# Patient Record
Sex: FEMALE | Race: WHITE | URBAN - METROPOLITAN AREA
[De-identification: names, ages, dates, MRNs, and addresses within clinical notes are randomized per-mention and may not be internally consistent; named-entity substitution may affect disease eponyms.]

---

## 2022-07-28 ENCOUNTER — APPOINTMENT (OUTPATIENT)
Dept: GENERAL RADIOLOGY | Age: 57
End: 2022-07-28
Payer: COMMERCIAL

## 2022-07-28 ENCOUNTER — APPOINTMENT (OUTPATIENT)
Dept: CT IMAGING | Age: 57
End: 2022-07-28
Payer: COMMERCIAL

## 2022-07-28 ENCOUNTER — HOSPITAL ENCOUNTER (EMERGENCY)
Age: 57
Discharge: HOME | End: 2022-07-28
Attending: STUDENT IN AN ORGANIZED HEALTH CARE EDUCATION/TRAINING PROGRAM
Payer: COMMERCIAL

## 2022-07-28 VITALS
BODY MASS INDEX: 47.09 KG/M2 | OXYGEN SATURATION: 95 % | HEART RATE: 93 BPM | WEIGHT: 293 LBS | RESPIRATION RATE: 18 BRPM | HEIGHT: 66 IN | SYSTOLIC BLOOD PRESSURE: 156 MMHG | DIASTOLIC BLOOD PRESSURE: 73 MMHG | TEMPERATURE: 98.2 F

## 2022-07-28 DIAGNOSIS — R06.02 SHORTNESS OF BREATH: ICD-10-CM

## 2022-07-28 DIAGNOSIS — R07.9 CHEST PAIN, UNSPECIFIED TYPE: Primary | ICD-10-CM

## 2022-07-28 DIAGNOSIS — J18.9 PNEUMONIA OF RIGHT LOWER LOBE DUE TO INFECTIOUS ORGANISM: ICD-10-CM

## 2022-07-28 DIAGNOSIS — I10 PRIMARY HYPERTENSION: ICD-10-CM

## 2022-07-28 LAB
A/G RATIO: 1.3 (ref 1.1–2.2)
ALBUMIN SERPL-MCNC: 4.3 G/DL (ref 3.4–5)
ALP BLD-CCNC: 118 U/L (ref 40–129)
ALT SERPL-CCNC: 59 U/L (ref 10–40)
ANION GAP SERPL CALCULATED.3IONS-SCNC: 13 MMOL/L (ref 3–16)
AST SERPL-CCNC: 62 U/L (ref 15–37)
BASOPHILS ABSOLUTE: 0.1 K/UL (ref 0–0.2)
BASOPHILS RELATIVE PERCENT: 1.5 %
BILIRUB SERPL-MCNC: 0.3 MG/DL (ref 0–1)
BUN BLDV-MCNC: 15 MG/DL (ref 7–20)
CALCIUM SERPL-MCNC: 9.7 MG/DL (ref 8.3–10.6)
CHLORIDE BLD-SCNC: 101 MMOL/L (ref 99–110)
CO2: 24 MMOL/L (ref 21–32)
CREAT SERPL-MCNC: 0.6 MG/DL (ref 0.6–1.1)
EKG ATRIAL RATE: 97 BPM
EKG DIAGNOSIS: NORMAL
EKG P AXIS: 54 DEGREES
EKG P-R INTERVAL: 210 MS
EKG Q-T INTERVAL: 358 MS
EKG QRS DURATION: 86 MS
EKG QTC CALCULATION (BAZETT): 454 MS
EKG R AXIS: 101 DEGREES
EKG T AXIS: 6 DEGREES
EKG VENTRICULAR RATE: 97 BPM
EOSINOPHILS ABSOLUTE: 0.2 K/UL (ref 0–0.6)
EOSINOPHILS RELATIVE PERCENT: 2.7 %
GFR AFRICAN AMERICAN: >60
GFR NON-AFRICAN AMERICAN: >60
GLUCOSE BLD-MCNC: 164 MG/DL (ref 70–99)
HCT VFR BLD CALC: 36.7 % (ref 36–48)
HEMOGLOBIN: 12 G/DL (ref 12–16)
LIPASE: 40 U/L (ref 13–60)
LYMPHOCYTES ABSOLUTE: 1.5 K/UL (ref 1–5.1)
LYMPHOCYTES RELATIVE PERCENT: 19 %
MCH RBC QN AUTO: 29.7 PG (ref 26–34)
MCHC RBC AUTO-ENTMCNC: 32.7 G/DL (ref 31–36)
MCV RBC AUTO: 90.6 FL (ref 80–100)
MONOCYTES ABSOLUTE: 0.9 K/UL (ref 0–1.3)
MONOCYTES RELATIVE PERCENT: 11.3 %
NEUTROPHILS ABSOLUTE: 5.2 K/UL (ref 1.7–7.7)
NEUTROPHILS RELATIVE PERCENT: 65.5 %
PDW BLD-RTO: 13.5 % (ref 12.4–15.4)
PLATELET # BLD: 371 K/UL (ref 135–450)
PMV BLD AUTO: 7.5 FL (ref 5–10.5)
POTASSIUM SERPL-SCNC: 3.9 MMOL/L (ref 3.5–5.1)
PRO-BNP: 29 PG/ML (ref 0–124)
RBC # BLD: 4.05 M/UL (ref 4–5.2)
SARS-COV-2, NAAT: NOT DETECTED
SODIUM BLD-SCNC: 138 MMOL/L (ref 136–145)
TOTAL PROTEIN: 7.7 G/DL (ref 6.4–8.2)
TROPONIN: <0.01 NG/ML
TROPONIN: <0.01 NG/ML
WBC # BLD: 7.9 K/UL (ref 4–11)

## 2022-07-28 PROCEDURE — 99285 EMERGENCY DEPT VISIT HI MDM: CPT

## 2022-07-28 PROCEDURE — 94640 AIRWAY INHALATION TREATMENT: CPT

## 2022-07-28 PROCEDURE — 6370000000 HC RX 637 (ALT 250 FOR IP): Performed by: STUDENT IN AN ORGANIZED HEALTH CARE EDUCATION/TRAINING PROGRAM

## 2022-07-28 PROCEDURE — 83880 ASSAY OF NATRIURETIC PEPTIDE: CPT

## 2022-07-28 PROCEDURE — 71045 X-RAY EXAM CHEST 1 VIEW: CPT

## 2022-07-28 PROCEDURE — 80053 COMPREHEN METABOLIC PANEL: CPT

## 2022-07-28 PROCEDURE — 6360000004 HC RX CONTRAST MEDICATION: Performed by: STUDENT IN AN ORGANIZED HEALTH CARE EDUCATION/TRAINING PROGRAM

## 2022-07-28 PROCEDURE — 93010 ELECTROCARDIOGRAM REPORT: CPT | Performed by: INTERNAL MEDICINE

## 2022-07-28 PROCEDURE — 93005 ELECTROCARDIOGRAM TRACING: CPT | Performed by: STUDENT IN AN ORGANIZED HEALTH CARE EDUCATION/TRAINING PROGRAM

## 2022-07-28 PROCEDURE — 84484 ASSAY OF TROPONIN QUANT: CPT

## 2022-07-28 PROCEDURE — 71260 CT THORAX DX C+: CPT | Performed by: STUDENT IN AN ORGANIZED HEALTH CARE EDUCATION/TRAINING PROGRAM

## 2022-07-28 PROCEDURE — 87635 SARS-COV-2 COVID-19 AMP PRB: CPT

## 2022-07-28 PROCEDURE — 83690 ASSAY OF LIPASE: CPT

## 2022-07-28 PROCEDURE — 85025 COMPLETE CBC W/AUTO DIFF WBC: CPT

## 2022-07-28 RX ORDER — IPRATROPIUM BROMIDE AND ALBUTEROL SULFATE 2.5; .5 MG/3ML; MG/3ML
1 SOLUTION RESPIRATORY (INHALATION)
Status: DISCONTINUED | OUTPATIENT
Start: 2022-07-28 | End: 2022-07-28

## 2022-07-28 RX ORDER — DOXYCYCLINE 100 MG/1
100 TABLET ORAL 2 TIMES DAILY
Qty: 20 TABLET | Refills: 0 | Status: SHIPPED | OUTPATIENT
Start: 2022-07-28 | End: 2022-08-07

## 2022-07-28 RX ORDER — IPRATROPIUM BROMIDE AND ALBUTEROL SULFATE 2.5; .5 MG/3ML; MG/3ML
1 SOLUTION RESPIRATORY (INHALATION) ONCE
Status: COMPLETED | OUTPATIENT
Start: 2022-07-28 | End: 2022-07-28

## 2022-07-28 RX ORDER — MORPHINE SULFATE 4 MG/ML
4 INJECTION, SOLUTION INTRAMUSCULAR; INTRAVENOUS ONCE
Status: DISCONTINUED | OUTPATIENT
Start: 2022-07-28 | End: 2022-07-28 | Stop reason: HOSPADM

## 2022-07-28 RX ORDER — PREDNISONE 50 MG/1
50 TABLET ORAL DAILY
Qty: 5 TABLET | Refills: 0 | Status: SHIPPED | OUTPATIENT
Start: 2022-07-28 | End: 2022-08-02

## 2022-07-28 RX ADMIN — IPRATROPIUM BROMIDE AND ALBUTEROL SULFATE 1 AMPULE: .5; 2.5 SOLUTION RESPIRATORY (INHALATION) at 09:27

## 2022-07-28 RX ADMIN — IOPAMIDOL 75 ML: 755 INJECTION, SOLUTION INTRAVENOUS at 09:19

## 2022-07-28 ASSESSMENT — ENCOUNTER SYMPTOMS
NAUSEA: 0
SHORTNESS OF BREATH: 1
ABDOMINAL PAIN: 1
SORE THROAT: 0
VOMITING: 0
COUGH: 0
DIARRHEA: 0
WHEEZING: 1
CONSTIPATION: 0
CHEST TIGHTNESS: 1
SINUS PRESSURE: 0
COLOR CHANGE: 0

## 2022-07-28 ASSESSMENT — PAIN DESCRIPTION - LOCATION: LOCATION: CHEST

## 2022-07-28 ASSESSMENT — PAIN SCALES - GENERAL: PAINLEVEL_OUTOF10: 6

## 2022-07-28 ASSESSMENT — PAIN DESCRIPTION - DESCRIPTORS: DESCRIPTORS: PRESSURE

## 2022-07-28 ASSESSMENT — PAIN - FUNCTIONAL ASSESSMENT: PAIN_FUNCTIONAL_ASSESSMENT: 0-10

## 2022-07-28 NOTE — Clinical Note
Vaibhav Baum was seen and treated in our emergency department on 7/28/2022. She may return to work on 08/01/2022. If you have any questions or concerns, please don't hesitate to call.       Leny Davis MD

## 2022-07-28 NOTE — ED PROVIDER NOTES
[address] [facility] Emergency Department       Date of evaluation: 7/28/2022    Chief Complaint     Chest Pain (Started after midnight, hx of asthma, given 2 of nitro and 324 asa in route with some relief)      History of Present Illness     Tacos Berry is a 61 y.o. female who presents chest pain and shortness of breath. She has history of asthma, diabetes, hyperlipidemia, hypertension, MEHUL on CPAP. States that chest pain started around midnight while she was at work. Described as pressure-like and worsens with inspiration. Chest pain has been constant. She received fentanyl and full dose of aspirin in route with some improvement of her chest pain however she reports that since arrival chest pain is increasing in severity. Reports increased shortness of breath. Symptoms worsen with exertion. states she feels like she cannot catch her breath. Denies any fever, cough, nausea, vomiting. Reports that she has history of acid reflux however this is not worsened with chest pain onset. Reports chronic abdominal pain which is not changed in character. Denies any dysuria, change in urinary frequency, diarrhea, constipation, or bloody stools. Reports increasing lower extremity swelling bilaterally. She is compliant with her CPAP nightly. Review of Systems     Review of Systems   Constitutional:  Positive for activity change and fatigue. Negative for chills and fever. HENT:  Negative for congestion, ear pain, sinus pressure and sore throat. Eyes:  Negative for visual disturbance. Respiratory:  Positive for chest tightness, shortness of breath and wheezing. Negative for cough. Cardiovascular:  Positive for chest pain and leg swelling. Negative for palpitations. Gastrointestinal:  Positive for abdominal pain. Negative for constipation, diarrhea, nausea and vomiting. Endocrine: Negative for polydipsia and polyuria.    Genitourinary:  Negative for dysuria, flank pain and urgency. Musculoskeletal:  Negative for neck pain and neck stiffness. Skin:  Negative for color change. Neurological:  Negative for dizziness, syncope, weakness, light-headedness and headaches. Psychiatric/Behavioral:  Negative for confusion. The patient is nervous/anxious. Past Medical, Surgical, Family, and Social History     She has a past medical history of Asthma, CPAP rhinitis, Diabetes mellitus ([address] Hyperlipidemia, Hypertension, MEHUL on CPAP, and Swelling of extremity. She has a past surgical history that includes ovarian cyst removal; Ovary removal; Cholecystectomy; and Carpal tunnel release. Her family history includes Arthritis in her father; Heart Disease in her father and mother; High Blood Pressure in her father; Other in her mother and sister; Stroke in her father. She reports current alcohol use. She reports that she does not use drugs.     Medications     Discharge Medication List as of 7/28/2022  1:46 PM        CONTINUE these medications which have NOT CHANGED    Details   irbesartan (AVAPRO) 300 MG tablet Take 300 mg by mouth nightly      fluticasone (FLONASE) 50 MCG/ACT nasal spray 1 spray by Nasal route daily      vitamin B-12 (CYANOCOBALAMIN) 500 MCG tablet Take 500 mcg by mouth daily      Cholecalciferol (VITAMIN D3) 2000 UNITS CAPS Take by mouth      Multiple Vitamins-Minerals (THERAPEUTIC MULTIVITAMIN-MINERALS) tablet Take 1 tablet by mouth daily Alive women's energy      ibuprofen (ADVIL;MOTRIN) 400 MG tablet Take 400 mg by mouth every 6 hours as needed for Pain      prednisoLONE acetate (PRED FORTE) 1 % ophthalmic suspension Place 1 drop into both eyes 4 times daily      potassium chloride (KLOR-CON) 20 MEQ packet Take 20 mEq by mouth daily      metFORMIN (GLUCOPHAGE) 500 MG tablet Take 500 mg by mouth 2 times daily (with meals)      guanFACINE (TENEX) 1 MG tablet Take 2 mg by mouth 2 times daily      albuterol (PROVENTIL HFA;VENTOLIN HFA) 108 (90 BASE) MCG/ACT inhaler Inhale 2 puffs into the lungs every 6 hours as needed for Wheezing      Fish Oil-Cholecalciferol (FISH OIL + D3) 5991-4634 MG-UNIT CAPS Take by mouth See Admin Instructions 1 tab in am, 2 tab at night      furosemide (LASIX) 80 MG tablet   Take 80 mg by mouth daily       sertraline (ZOLOFT) 50 MG tablet   Take 100 mg by mouth daily              Allergies     She is allergic to aloe, sulfa antibiotics, and sunflower oil. Physical Exam     INITIAL VITALS: BP: (!) 205/79, Temp: 98.2 °F (36.8 °C), Heart Rate: (!) 101, Resp: 18, SpO2: 96 %   Physical Exam  Vitals and nursing note reviewed. Constitutional:       Appearance: She is obese. She is not toxic-appearing. HENT:      Head: Normocephalic and atraumatic. Right Ear: External ear normal.      Left Ear: External ear normal.      Nose: Nose normal.      Mouth/Throat:      Pharynx: Oropharynx is clear. Eyes:      General: No scleral icterus. Conjunctiva/sclera: Conjunctivae normal.      Pupils: Pupils are equal, round, and reactive to light. Cardiovascular:      Rate and Rhythm: Regular rhythm. Tachycardia present. Heart sounds: Normal heart sounds. No murmur heard. No friction rub. No gallop. Pulmonary:      Effort: No respiratory distress. Breath sounds: Wheezing present. No rhonchi or rales. Chest:      Chest wall: Tenderness (left wall tenderness) present. Abdominal:      General: There is no distension. Palpations: Abdomen is soft. Tenderness: There is no abdominal tenderness. There is no right CVA tenderness, left CVA tenderness, guarding or rebound. Musculoskeletal:         General: Normal range of motion. Cervical back: Normal range of motion and neck supple. No rigidity. Right lower leg: Edema (1+ non pitting) present. Left lower leg: Edema (1+ non pitting) present. Skin:     Capillary Refill: Capillary refill takes less than 2 seconds. Coloration: Skin is not pale. Findings: No rash. Neurological:      General: No focal deficit present. Mental Status: She is alert. Cranial Nerves: No cranial nerve deficit. Sensory: No sensory deficit. Motor: No weakness. Coordination: Coordination normal.      Gait: Gait normal.   Psychiatric:         Mood and Affect: Mood normal.         Behavior: Behavior normal.       Diagnostic Results     EKG   I interpreted the EKG and note:  Sinus rhythm with first-degree AV block, ventricular rate of 97, rightward axis, T wave inversion inferior lead, no ST elevation or depression concerning for ischemia. No prior EKG to compare to    RADIOLOGY:  CT CHEST PULMONARY EMBOLISM W CONTRAST   Final Result   Suboptimal opacification of the pulmonary arteries limiting evaluation for   pulmonary embolism. No evidence of central pulmonary embolism.          XR CHEST PORTABLE   Final Result   Right basilar segmental atelectasis versus pneumonia             LABS:   Results for orders placed or performed during the hospital encounter of 07/28/22   SARS-CoV-2 NAAT (Rapid)    Specimen: Nasopharyngeal Swab   Result Value Ref Range    SARS-CoV-2, NAAT Not Detected Not Detected   CBC with Auto Differential   Result Value Ref Range    WBC 7.9 4.0 - 11.0 K/uL    RBC 4.05 4.00 - 5.20 M/uL    Hemoglobin 12.0 12.0 - 16.0 g/dL    Hematocrit 36.7 36.0 - 48.0 %    MCV 90.6 80.0 - 100.0 fL    MCH 29.7 26.0 - 34.0 pg    MCHC 32.7 31.0 - 36.0 g/dL    RDW 13.5 12.4 - 15.4 %    Platelets 897 629 - 132 K/uL    MPV 7.5 5.0 - 10.5 fL    Neutrophils % 65.5 %    Lymphocytes % 19.0 %    Monocytes % 11.3 %    Eosinophils % 2.7 %    Basophils % 1.5 %    Neutrophils Absolute 5.2 1.7 - 7.7 K/uL    Lymphocytes Absolute 1.5 1.0 - 5.1 K/uL    Monocytes Absolute 0.9 0.0 - 1.3 K/uL    Eosinophils Absolute 0.2 0.0 - 0.6 K/uL    Basophils Absolute 0.1 0.0 - 0.2 K/uL   CMP   Result Value Ref Range    Sodium 138 136 - 145 mmol/L    Potassium 3.9 3.5 - 5.1 mmol/L    Chloride 101 99 - 110 mmol/L    CO2 24 21 - 32 mmol/L    Anion Gap 13 3 - 16    Glucose 164 (H) 70 - 99 mg/dL    BUN 15 7 - 20 mg/dL    Creatinine 0.6 0.6 - 1.1 mg/dL    GFR Non-African American >60 >60    GFR African American >60 >60    Calcium 9.7 8.3 - 10.6 mg/dL    Total Protein 7.7 6.4 - 8.2 g/dL    Albumin 4.3 3.4 - 5.0 g/dL    Albumin/Globulin Ratio 1.3 1.1 - 2.2    Total Bilirubin 0.3 0.0 - 1.0 mg/dL    Alkaline Phosphatase 118 40 - 129 U/L    ALT 59 (H) 10 - 40 U/L    AST 62 (H) 15 - 37 U/L   Lipase   Result Value Ref Range    Lipase 40.0 13.0 - 60.0 U/L   Troponin   Result Value Ref Range    Troponin <0.01 <0.01 ng/mL   Brain Natriuretic Peptide   Result Value Ref Range    Pro-BNP 29 0 - 124 pg/mL   Troponin   Result Value Ref Range    Troponin <0.01 <0.01 ng/mL   EKG 12 Lead   Result Value Ref Range    Ventricular Rate 97 BPM    Atrial Rate 97 BPM    P-R Interval 210 ms    QRS Duration 86 ms    Q-T Interval 358 ms    QTc Calculation (Bazett) 454 ms    P Axis 54 degrees    R Axis 101 degrees    T Axis 6 degrees    Diagnosis       Sinus rhythm with 1st degree A-V blockRightward axisSeptal infarct , age undeterminedT wave abnormality, consider inferior ischemiaAbnormal ECGWhen compared with ECG of 12-JUN-2008 17:23,RI interval has increasedSeptal infarct is now Present       ED BEDSIDE ULTRASOUND:  No results found. RECENT VITALS:  BP: (!) 156/73,Temp: 98.2 °F (36.8 °C), Heart Rate: 93, Resp: 18, SpO2: 95 %     Procedures     NA    ED Course     Nursing Notes, Past Medical Hx, Past Surgical Hx, Social Hx,Allergies, and Family Hx were reviewed.          patient was given the following medications:  Orders Placed This Encounter   Medications    DISCONTD: morphine sulfate (PF) injection 4 mg    DISCONTD: ipratropium-albuterol (DUONEB) nebulizer solution 1 ampule     Order Specific Question:   Initiate RT Bronchodilator Protocol     Answer:   Yes - ED protocol    iopamidol (ISOVUE-370) 76 % injection 75 mL    ipratropium-albuterol (DUONEB) nebulizer solution 1 ampule     Order Specific Question:   Initiate RT Bronchodilator Protocol     Answer:   Yes - ED protocol    doxycycline monohydrate (ADOXA) 100 MG tablet     Sig: Take 1 tablet by mouth in the morning and 1 tablet before bedtime. Do all this for 10 days. Dispense:  20 tablet     Refill:  0    predniSONE (DELTASONE) 50 MG tablet     Sig: Take 1 tablet by mouth in the morning for 5 days. Dispense:  5 tablet     Refill:  0       CONSULTS:  None    MEDICAL DECISIONMAKING / ASSESSMENT / PLAN     Ciara Lund is a 61 y.o. female chest pain. Patient presented hypertensive to 205/79, afebrile, tachycardic at 101, normal respiratory rate of 18 and satting at 96% on room air. On exam she did have diffuse and expiratory wheezing as well as some bilateral nonpitting edema. Given history and physical exam my differential diagnosis includes but is not limited to ACS, asthma exacerbation, COPD exacerbation, hypertensive emergency, pneumonia, CHF, PE. We obtained labs and imaging studies as noted below    I interpreted the labs and note:  CBC: No leukocytosis or leukopenia, no evidence of anemia with a hemoglobin of 12 and hematocrit of 36.7, normal platelet count  CMP no significant electrolyte derangements, normal renal function with a BUN of 15 and creatinine of 0.6, hyperglycemic to 164, elevated AST to 62 and ALT to 59 however this is improved from prior. Appears to have chronically elevated but stable transaminases. Normal alk phos and bilirubin. Normal lipase at 40  proBNP within normal limits at 29  Troponin negative at less than 0.01  Delta troponin negative at less than 0.01      I interpreted the imaging and note:  Chest x-ray right basilar segmental atelectasis concerning for pneumonia. She does have mild cardiomegaly. No effusion or pneumothorax  CTA chest: No evidence of PE or additional acute cardiac change.     Given work-up chest pain and shortness of breath likely secondary to developing pneumonia. I am reassured by her EKG and troponin negative x2. Patient is moderate risk heart score however at this time she feels that her symptoms are likely due to pneumonia and asthma. She denies wanting to be admitted for this. She was able to ambulate with O2 sats remaining at 92%. Patient does not appear septic or meet SIRS criteria. Given this we felt patient was stable for discharge home. I will start her on doxycycline as well as prednisone to improve her breathing. Patient is amenable with this. Her hypertension improved to 156/73 at the time of discharge. Strict return precautions were discussed. All questions and concerns were addressed. Clinical Impression     1. Chest pain, unspecified type    2. Primary hypertension    3. Pneumonia of right lower lobe due to infectious organism    4. Shortness of breath        Disposition     PATIENT REFERRED TO:  No follow-up provider specified. DISCHARGE MEDICATIONS:  Discharge Medication List as of 7/28/2022  1:46 PM        START taking these medications    Details   doxycycline monohydrate (ADOXA) 100 MG tablet Take 1 tablet by mouth in the morning and 1 tablet before bedtime. Do all this for 10 days. , Disp-20 tablet, R-0Normal      predniSONE (DELTASONE) 50 MG tablet Take 1 tablet by mouth in the morning for 5 days. , Disp-5 tablet, R-0Normal             DISPOSITION Decision To Discharge 07/28/2022 01:10:01 PM         Nikki Rush MD  07/28/22 0245

## 2022-07-28 NOTE — ED NOTES
Patient ambulated with pulse ox and maintained 92% O2 on RA but was short of breath which is normal due to her asthma.      Frederic Shepherd RN  07/28/22 [id number]

## 2022-07-28 NOTE — DISCHARGE INSTRUCTIONS
You were seen in the emergency department for shortness of breath and chest pain. We feel that your symptoms are likely due to possible pneumonia. We are prescribing you antibiotics and prednisone which should help with your symptoms. Please make sure to check your glucose closely while on prednisone as it can elevate the reading. Return to the emergency department if you have ongoing or worsening shortness of breath, chest pain, vomiting, passout, fever or any new or concerning changes to your health.

## 2023-12-04 ENCOUNTER — HOSPITAL ENCOUNTER (INPATIENT)
Age: 58
LOS: 10 days | Discharge: HOME | End: 2023-12-14
Attending: EMERGENCY MEDICINE | Admitting: INTERNAL MEDICINE
Payer: COMMERCIAL

## 2023-12-04 ENCOUNTER — APPOINTMENT (OUTPATIENT)
Dept: GENERAL RADIOLOGY | Age: 58
End: 2023-12-04
Payer: COMMERCIAL

## 2023-12-04 ENCOUNTER — APPOINTMENT (OUTPATIENT)
Dept: CT IMAGING | Age: 58
End: 2023-12-04
Payer: COMMERCIAL

## 2023-12-04 DIAGNOSIS — R65.20 SEVERE SEPSIS (HCC): ICD-10-CM

## 2023-12-04 DIAGNOSIS — J18.9 PNEUMONIA OF RIGHT LOWER LOBE DUE TO INFECTIOUS ORGANISM: ICD-10-CM

## 2023-12-04 DIAGNOSIS — R09.02 HYPOXIA: ICD-10-CM

## 2023-12-04 DIAGNOSIS — E87.20 LACTIC ACIDOSIS: ICD-10-CM

## 2023-12-04 DIAGNOSIS — A41.9 SEVERE SEPSIS (HCC): ICD-10-CM

## 2023-12-04 DIAGNOSIS — J96.01 ACUTE RESPIRATORY FAILURE WITH HYPOXIA (HCC): Primary | ICD-10-CM

## 2023-12-04 DIAGNOSIS — R06.03 ACUTE RESPIRATORY DISTRESS: ICD-10-CM

## 2023-12-04 DIAGNOSIS — J44.1 COPD EXACERBATION (HCC): ICD-10-CM

## 2023-12-04 LAB
ALBUMIN SERPL-MCNC: 4.6 G/DL (ref 3.4–5)
ALBUMIN/GLOB SERPL: 1.2 {RATIO} (ref 1.1–2.2)
ALP SERPL-CCNC: 99 U/L (ref 40–129)
ALT SERPL-CCNC: 66 U/L (ref 10–40)
ANION GAP SERPL CALCULATED.3IONS-SCNC: 18 MMOL/L (ref 3–16)
ANION GAP SERPL CALCULATED.3IONS-SCNC: 20 MMOL/L (ref 3–16)
AST SERPL-CCNC: 61 U/L (ref 15–37)
BACTERIA URNS QL MICRO: ABNORMAL /HPF
BASE EXCESS BLDV CALC-SCNC: 2.7 MMOL/L (ref -3–3)
BASOPHILS # BLD: 0 K/UL (ref 0–0.2)
BASOPHILS NFR BLD: 0.5 %
BILIRUB SERPL-MCNC: 0.5 MG/DL (ref 0–1)
BILIRUB UR QL STRIP.AUTO: NEGATIVE
BUN SERPL-MCNC: 11 MG/DL (ref 7–20)
BUN SERPL-MCNC: 14 MG/DL (ref 7–20)
CALCIUM SERPL-MCNC: 10.1 MG/DL (ref 8.3–10.6)
CALCIUM SERPL-MCNC: 9 MG/DL (ref 8.3–10.6)
CHLORIDE SERPL-SCNC: 92 MMOL/L (ref 99–110)
CHLORIDE SERPL-SCNC: 93 MMOL/L (ref 99–110)
CLARITY UR: CLEAR
CO2 BLDV-SCNC: 29 MMOL/L
CO2 SERPL-SCNC: 22 MMOL/L (ref 21–32)
CO2 SERPL-SCNC: 25 MMOL/L (ref 21–32)
COHGB MFR BLDV: 1.7 % (ref 0–1.5)
COLOR UR: ABNORMAL
CREAT SERPL-MCNC: 0.7 MG/DL (ref 0.6–1.1)
CREAT SERPL-MCNC: 0.9 MG/DL (ref 0.6–1.1)
DEPRECATED RDW RBC AUTO: 13.1 % (ref 12.4–15.4)
EKG ATRIAL RATE: 116 BPM
EKG DIAGNOSIS: NORMAL
EKG P AXIS: 65 DEGREES
EKG P-R INTERVAL: 172 MS
EKG Q-T INTERVAL: 326 MS
EKG QRS DURATION: 96 MS
EKG QTC CALCULATION (BAZETT): 453 MS
EKG R AXIS: 118 DEGREES
EKG T AXIS: -1 DEGREES
EKG VENTRICULAR RATE: 116 BPM
EOSINOPHIL # BLD: 0.1 K/UL (ref 0–0.6)
EOSINOPHIL NFR BLD: 1.3 %
EPI CELLS #/AREA URNS HPF: ABNORMAL /HPF (ref 0–5)
FLUAV RNA RESP QL NAA+PROBE: NOT DETECTED
FLUBV RNA RESP QL NAA+PROBE: NOT DETECTED
GFR SERPLBLD CREATININE-BSD FMLA CKD-EPI: >60 ML/MIN/{1.73_M2}
GFR SERPLBLD CREATININE-BSD FMLA CKD-EPI: >60 ML/MIN/{1.73_M2}
GLUCOSE BLD-MCNC: 248 MG/DL (ref 70–99)
GLUCOSE BLD-MCNC: 294 MG/DL (ref 70–99)
GLUCOSE SERPL-MCNC: 209 MG/DL (ref 70–99)
GLUCOSE SERPL-MCNC: 306 MG/DL (ref 70–99)
GLUCOSE UR STRIP.AUTO-MCNC: NEGATIVE MG/DL
HCO3 BLDV-SCNC: 27.3 MMOL/L (ref 23–29)
HCT VFR BLD AUTO: 41.8 % (ref 36–48)
HGB BLD-MCNC: 13.8 G/DL (ref 12–16)
HGB UR QL STRIP.AUTO: ABNORMAL
KETONES UR STRIP.AUTO-MCNC: NEGATIVE MG/DL
LACTATE BLDV-SCNC: 4.1 MMOL/L (ref 0.4–2)
LACTATE BLDV-SCNC: 4.1 MMOL/L (ref 0.4–2)
LACTATE BLDV-SCNC: 4.8 MMOL/L (ref 0.4–2)
LACTATE BLDV-SCNC: 5.4 MMOL/L (ref 0.4–2)
LEUKOCYTE ESTERASE UR QL STRIP.AUTO: NEGATIVE
LYMPHOCYTES # BLD: 0.5 K/UL (ref 1–5.1)
LYMPHOCYTES NFR BLD: 6.7 %
MAGNESIUM SERPL-MCNC: 1.7 MG/DL (ref 1.8–2.4)
MCH RBC QN AUTO: 29.8 PG (ref 26–34)
MCHC RBC AUTO-ENTMCNC: 33 G/DL (ref 31–36)
MCV RBC AUTO: 90.2 FL (ref 80–100)
METHGB MFR BLDV: 0.5 %
MONOCYTES # BLD: 0.8 K/UL (ref 0–1.3)
MONOCYTES NFR BLD: 11.5 %
NEUTROPHILS # BLD: 5.9 K/UL (ref 1.7–7.7)
NEUTROPHILS NFR BLD: 80 %
NITRITE UR QL STRIP.AUTO: NEGATIVE
NT-PROBNP SERPL-MCNC: 169 PG/ML (ref 0–124)
O2 THERAPY: ABNORMAL
PCO2 BLDV: 42.2 MMHG (ref 40–50)
PERFORMED ON: ABNORMAL
PERFORMED ON: ABNORMAL
PH BLDV: 7.43 [PH] (ref 7.35–7.45)
PH UR STRIP.AUTO: 6.5 [PH] (ref 5–8)
PLATELET # BLD AUTO: 297 K/UL (ref 135–450)
PMV BLD AUTO: 7.3 FL (ref 5–10.5)
PO2 BLDV: 40.6 MMHG (ref 25–40)
POTASSIUM SERPL-SCNC: 4 MMOL/L (ref 3.5–5.1)
POTASSIUM SERPL-SCNC: 4.1 MMOL/L (ref 3.5–5.1)
PROCALCITONIN SERPL IA-MCNC: 0.11 NG/ML (ref 0–0.15)
PROT SERPL-MCNC: 8.4 G/DL (ref 6.4–8.2)
PROT UR STRIP.AUTO-MCNC: NEGATIVE MG/DL
RBC # BLD AUTO: 4.64 M/UL (ref 4–5.2)
RBC #/AREA URNS HPF: ABNORMAL /HPF (ref 0–4)
RSV AG NOSE QL: NEGATIVE
SAO2 % BLDV: 77 %
SARS-COV-2 RNA RESP QL NAA+PROBE: NOT DETECTED
SODIUM SERPL-SCNC: 133 MMOL/L (ref 136–145)
SODIUM SERPL-SCNC: 137 MMOL/L (ref 136–145)
SP GR UR STRIP.AUTO: 1.01 (ref 1–1.03)
SPECIMEN STATUS: NORMAL
TROPONIN, HIGH SENSITIVITY: 11 NG/L (ref 0–14)
TROPONIN, HIGH SENSITIVITY: 11 NG/L (ref 0–14)
UA COMPLETE W REFLEX CULTURE PNL UR: ABNORMAL
UA DIPSTICK W REFLEX MICRO PNL UR: YES
URN SPEC COLLECT METH UR: ABNORMAL
UROBILINOGEN UR STRIP-ACNC: 0.2 E.U./DL
WBC # BLD AUTO: 7.4 K/UL (ref 4–11)
WBC #/AREA URNS HPF: ABNORMAL /HPF (ref 0–5)

## 2023-12-04 PROCEDURE — 96365 THER/PROPH/DIAG IV INF INIT: CPT

## 2023-12-04 PROCEDURE — 93010 ELECTROCARDIOGRAM REPORT: CPT | Performed by: INTERNAL MEDICINE

## 2023-12-04 PROCEDURE — 6360000002 HC RX W HCPCS: Performed by: EMERGENCY MEDICINE

## 2023-12-04 PROCEDURE — 83735 ASSAY OF MAGNESIUM: CPT

## 2023-12-04 PROCEDURE — 93005 ELECTROCARDIOGRAM TRACING: CPT | Performed by: EMERGENCY MEDICINE

## 2023-12-04 PROCEDURE — 80053 COMPREHEN METABOLIC PANEL: CPT

## 2023-12-04 PROCEDURE — 6370000000 HC RX 637 (ALT 250 FOR IP): Performed by: INTERNAL MEDICINE

## 2023-12-04 PROCEDURE — 2580000003 HC RX 258: Performed by: EMERGENCY MEDICINE

## 2023-12-04 PROCEDURE — 85025 COMPLETE CBC W/AUTO DIFF WBC: CPT

## 2023-12-04 PROCEDURE — 1200000000 HC SEMI PRIVATE

## 2023-12-04 PROCEDURE — 6370000000 HC RX 637 (ALT 250 FOR IP): Performed by: NURSE PRACTITIONER

## 2023-12-04 PROCEDURE — 6360000002 HC RX W HCPCS: Performed by: INTERNAL MEDICINE

## 2023-12-04 PROCEDURE — 99285 EMERGENCY DEPT VISIT HI MDM: CPT

## 2023-12-04 PROCEDURE — 71260 CT THORAX DX C+: CPT

## 2023-12-04 PROCEDURE — 6370000000 HC RX 637 (ALT 250 FOR IP): Performed by: EMERGENCY MEDICINE

## 2023-12-04 PROCEDURE — 83880 ASSAY OF NATRIURETIC PEPTIDE: CPT

## 2023-12-04 PROCEDURE — 87449 NOS EACH ORGANISM AG IA: CPT

## 2023-12-04 PROCEDURE — 87807 RSV ASSAY W/OPTIC: CPT

## 2023-12-04 PROCEDURE — 36415 COLL VENOUS BLD VENIPUNCTURE: CPT

## 2023-12-04 PROCEDURE — 81001 URINALYSIS AUTO W/SCOPE: CPT

## 2023-12-04 PROCEDURE — 83605 ASSAY OF LACTIC ACID: CPT

## 2023-12-04 PROCEDURE — 82803 BLOOD GASES ANY COMBINATION: CPT

## 2023-12-04 PROCEDURE — 2580000003 HC RX 258: Performed by: INTERNAL MEDICINE

## 2023-12-04 PROCEDURE — 2580000003 HC RX 258: Performed by: NURSE PRACTITIONER

## 2023-12-04 PROCEDURE — 87636 SARSCOV2 & INF A&B AMP PRB: CPT

## 2023-12-04 PROCEDURE — 6360000004 HC RX CONTRAST MEDICATION: Performed by: EMERGENCY MEDICINE

## 2023-12-04 PROCEDURE — 96376 TX/PRO/DX INJ SAME DRUG ADON: CPT

## 2023-12-04 PROCEDURE — 96375 TX/PRO/DX INJ NEW DRUG ADDON: CPT

## 2023-12-04 PROCEDURE — 2700000000 HC OXYGEN THERAPY PER DAY

## 2023-12-04 PROCEDURE — 71045 X-RAY EXAM CHEST 1 VIEW: CPT

## 2023-12-04 PROCEDURE — 84484 ASSAY OF TROPONIN QUANT: CPT

## 2023-12-04 PROCEDURE — 94640 AIRWAY INHALATION TREATMENT: CPT

## 2023-12-04 PROCEDURE — 94761 N-INVAS EAR/PLS OXIMETRY MLT: CPT

## 2023-12-04 PROCEDURE — 84145 PROCALCITONIN (PCT): CPT

## 2023-12-04 PROCEDURE — 87040 BLOOD CULTURE FOR BACTERIA: CPT

## 2023-12-04 RX ORDER — 0.9 % SODIUM CHLORIDE 0.9 %
1000 INTRAVENOUS SOLUTION INTRAVENOUS ONCE
Status: COMPLETED | OUTPATIENT
Start: 2023-12-04 | End: 2023-12-04

## 2023-12-04 RX ORDER — SODIUM CHLORIDE 0.9 % (FLUSH) 0.9 %
5-40 SYRINGE (ML) INJECTION EVERY 12 HOURS SCHEDULED
Status: DISCONTINUED | OUTPATIENT
Start: 2023-12-04 | End: 2023-12-14 | Stop reason: HOSPADM

## 2023-12-04 RX ORDER — IPRATROPIUM BROMIDE AND ALBUTEROL SULFATE 2.5; .5 MG/3ML; MG/3ML
1 SOLUTION RESPIRATORY (INHALATION)
Status: DISCONTINUED | OUTPATIENT
Start: 2023-12-04 | End: 2023-12-04

## 2023-12-04 RX ORDER — SODIUM CHLORIDE 9 MG/ML
INJECTION, SOLUTION INTRAVENOUS ONCE
Status: COMPLETED | OUTPATIENT
Start: 2023-12-04 | End: 2023-12-04

## 2023-12-04 RX ORDER — M-VIT,TX,IRON,MINS/CALC/FOLIC 27MG-0.4MG
1 TABLET ORAL DAILY
Status: DISCONTINUED | OUTPATIENT
Start: 2023-12-05 | End: 2023-12-14 | Stop reason: HOSPADM

## 2023-12-04 RX ORDER — GLUCAGON 1 MG/ML
1 KIT INJECTION PRN
Status: DISCONTINUED | OUTPATIENT
Start: 2023-12-04 | End: 2023-12-14 | Stop reason: HOSPADM

## 2023-12-04 RX ORDER — IPRATROPIUM BROMIDE AND ALBUTEROL SULFATE 2.5; .5 MG/3ML; MG/3ML
1 SOLUTION RESPIRATORY (INHALATION)
Status: DISCONTINUED | OUTPATIENT
Start: 2023-12-05 | End: 2023-12-04

## 2023-12-04 RX ORDER — ACETAMINOPHEN 325 MG/1
650 TABLET ORAL EVERY 6 HOURS PRN
Status: DISCONTINUED | OUTPATIENT
Start: 2023-12-04 | End: 2023-12-14 | Stop reason: HOSPADM

## 2023-12-04 RX ORDER — IPRATROPIUM BROMIDE AND ALBUTEROL SULFATE 2.5; .5 MG/3ML; MG/3ML
1 SOLUTION RESPIRATORY (INHALATION)
Status: DISCONTINUED | OUTPATIENT
Start: 2023-12-04 | End: 2023-12-05

## 2023-12-04 RX ORDER — INSULIN LISPRO 100 [IU]/ML
0-4 INJECTION, SOLUTION INTRAVENOUS; SUBCUTANEOUS NIGHTLY
Status: DISCONTINUED | OUTPATIENT
Start: 2023-12-04 | End: 2023-12-07

## 2023-12-04 RX ORDER — TELMISARTAN 80 MG/1
80 TABLET ORAL DAILY
COMMUNITY

## 2023-12-04 RX ORDER — ONDANSETRON 4 MG/1
4 TABLET, ORALLY DISINTEGRATING ORAL EVERY 8 HOURS PRN
Status: DISCONTINUED | OUTPATIENT
Start: 2023-12-04 | End: 2023-12-14 | Stop reason: HOSPADM

## 2023-12-04 RX ORDER — MAGNESIUM SULFATE IN WATER 40 MG/ML
2000 INJECTION, SOLUTION INTRAVENOUS PRN
Status: DISCONTINUED | OUTPATIENT
Start: 2023-12-04 | End: 2023-12-14 | Stop reason: HOSPADM

## 2023-12-04 RX ORDER — IPRATROPIUM BROMIDE AND ALBUTEROL SULFATE 2.5; .5 MG/3ML; MG/3ML
1 SOLUTION RESPIRATORY (INHALATION) ONCE
Status: COMPLETED | OUTPATIENT
Start: 2023-12-04 | End: 2023-12-04

## 2023-12-04 RX ORDER — HYDROCODONE BITARTRATE AND HOMATROPINE METHYLBROMIDE ORAL SOLUTION 5; 1.5 MG/5ML; MG/5ML
5 LIQUID ORAL EVERY 4 HOURS PRN
Status: COMPLETED | OUTPATIENT
Start: 2023-12-04 | End: 2023-12-05

## 2023-12-04 RX ORDER — IPRATROPIUM BROMIDE AND ALBUTEROL SULFATE 2.5; .5 MG/3ML; MG/3ML
1 SOLUTION RESPIRATORY (INHALATION) EVERY 4 HOURS PRN
Status: DISCONTINUED | OUTPATIENT
Start: 2023-12-04 | End: 2023-12-04

## 2023-12-04 RX ORDER — MORPHINE SULFATE 2 MG/ML
2 INJECTION, SOLUTION INTRAMUSCULAR; INTRAVENOUS ONCE
Status: COMPLETED | OUTPATIENT
Start: 2023-12-04 | End: 2023-12-04

## 2023-12-04 RX ORDER — LOSARTAN POTASSIUM 100 MG/1
100 TABLET ORAL
Status: DISCONTINUED | OUTPATIENT
Start: 2023-12-04 | End: 2023-12-05 | Stop reason: ALTCHOICE

## 2023-12-04 RX ORDER — POLYETHYLENE GLYCOL 3350 17 G/17G
17 POWDER, FOR SOLUTION ORAL DAILY PRN
Status: DISCONTINUED | OUTPATIENT
Start: 2023-12-04 | End: 2023-12-14 | Stop reason: HOSPADM

## 2023-12-04 RX ORDER — SODIUM CHLORIDE 9 MG/ML
INJECTION, SOLUTION INTRAVENOUS PRN
Status: DISCONTINUED | OUTPATIENT
Start: 2023-12-04 | End: 2023-12-14 | Stop reason: HOSPADM

## 2023-12-04 RX ORDER — DEXTROSE MONOHYDRATE 100 MG/ML
INJECTION, SOLUTION INTRAVENOUS CONTINUOUS PRN
Status: DISCONTINUED | OUTPATIENT
Start: 2023-12-04 | End: 2023-12-14 | Stop reason: HOSPADM

## 2023-12-04 RX ORDER — DEXAMETHASONE SODIUM PHOSPHATE 4 MG/ML
10 INJECTION, SOLUTION INTRA-ARTICULAR; INTRALESIONAL; INTRAMUSCULAR; INTRAVENOUS; SOFT TISSUE ONCE
Status: COMPLETED | OUTPATIENT
Start: 2023-12-04 | End: 2023-12-04

## 2023-12-04 RX ORDER — POTASSIUM CHLORIDE 20 MEQ/1
40 TABLET, EXTENDED RELEASE ORAL PRN
Status: DISCONTINUED | OUTPATIENT
Start: 2023-12-04 | End: 2023-12-14 | Stop reason: HOSPADM

## 2023-12-04 RX ORDER — SODIUM CHLORIDE 0.9 % (FLUSH) 0.9 %
5-40 SYRINGE (ML) INJECTION PRN
Status: DISCONTINUED | OUTPATIENT
Start: 2023-12-04 | End: 2023-12-14 | Stop reason: HOSPADM

## 2023-12-04 RX ORDER — INSULIN LISPRO 100 [IU]/ML
0-8 INJECTION, SOLUTION INTRAVENOUS; SUBCUTANEOUS
Status: DISCONTINUED | OUTPATIENT
Start: 2023-12-04 | End: 2023-12-07

## 2023-12-04 RX ORDER — POTASSIUM CHLORIDE 7.45 MG/ML
10 INJECTION INTRAVENOUS PRN
Status: DISCONTINUED | OUTPATIENT
Start: 2023-12-04 | End: 2023-12-14 | Stop reason: HOSPADM

## 2023-12-04 RX ORDER — CHOLECALCIFEROL (VITAMIN D3) 125 MCG
500 CAPSULE ORAL DAILY
Status: DISCONTINUED | OUTPATIENT
Start: 2023-12-05 | End: 2023-12-14 | Stop reason: HOSPADM

## 2023-12-04 RX ORDER — GUANFACINE 1 MG/1
2 TABLET ORAL 2 TIMES DAILY
Status: DISCONTINUED | OUTPATIENT
Start: 2023-12-04 | End: 2023-12-14 | Stop reason: HOSPADM

## 2023-12-04 RX ORDER — ONDANSETRON 2 MG/ML
4 INJECTION INTRAMUSCULAR; INTRAVENOUS ONCE
Status: COMPLETED | OUTPATIENT
Start: 2023-12-04 | End: 2023-12-04

## 2023-12-04 RX ORDER — ONDANSETRON 2 MG/ML
4 INJECTION INTRAMUSCULAR; INTRAVENOUS EVERY 6 HOURS PRN
Status: DISCONTINUED | OUTPATIENT
Start: 2023-12-04 | End: 2023-12-14 | Stop reason: HOSPADM

## 2023-12-04 RX ORDER — BENZONATATE 100 MG/1
100 CAPSULE ORAL 3 TIMES DAILY PRN
Status: DISCONTINUED | OUTPATIENT
Start: 2023-12-04 | End: 2023-12-14 | Stop reason: HOSPADM

## 2023-12-04 RX ORDER — ENOXAPARIN SODIUM 100 MG/ML
40 INJECTION SUBCUTANEOUS 2 TIMES DAILY
Status: DISCONTINUED | OUTPATIENT
Start: 2023-12-04 | End: 2023-12-14 | Stop reason: HOSPADM

## 2023-12-04 RX ORDER — ACETAMINOPHEN 650 MG/1
650 SUPPOSITORY RECTAL EVERY 6 HOURS PRN
Status: DISCONTINUED | OUTPATIENT
Start: 2023-12-04 | End: 2023-12-14 | Stop reason: HOSPADM

## 2023-12-04 RX ADMIN — INSULIN LISPRO 4 UNITS: 100 INJECTION, SOLUTION INTRAVENOUS; SUBCUTANEOUS at 22:35

## 2023-12-04 RX ADMIN — HYDROCODONE BITARTRATE AND HOMATROPINE METHYLBROMIDE 5 ML: 5; 1.5 SOLUTION ORAL at 22:26

## 2023-12-04 RX ADMIN — ACETAMINOPHEN 650 MG: 325 TABLET, FILM COATED ORAL at 17:56

## 2023-12-04 RX ADMIN — ONDANSETRON 4 MG: 2 INJECTION INTRAMUSCULAR; INTRAVENOUS at 10:31

## 2023-12-04 RX ADMIN — SERTRALINE 100 MG: 50 TABLET, FILM COATED ORAL at 22:27

## 2023-12-04 RX ADMIN — IPRATROPIUM BROMIDE AND ALBUTEROL SULFATE 1 DOSE: 2.5; .5 SOLUTION RESPIRATORY (INHALATION) at 10:31

## 2023-12-04 RX ADMIN — NITROGLYCERIN 1 INCH: 20 OINTMENT TOPICAL at 10:32

## 2023-12-04 RX ADMIN — BENZONATATE 100 MG: 100 CAPSULE ORAL at 17:57

## 2023-12-04 RX ADMIN — ENOXAPARIN SODIUM 40 MG: 100 INJECTION SUBCUTANEOUS at 18:10

## 2023-12-04 RX ADMIN — AZITHROMYCIN MONOHYDRATE 500 MG: 500 INJECTION, POWDER, LYOPHILIZED, FOR SOLUTION INTRAVENOUS at 12:32

## 2023-12-04 RX ADMIN — SODIUM CHLORIDE: 9 INJECTION, SOLUTION INTRAVENOUS at 22:24

## 2023-12-04 RX ADMIN — IOPAMIDOL 75 ML: 755 INJECTION, SOLUTION INTRAVENOUS at 11:42

## 2023-12-04 RX ADMIN — SODIUM CHLORIDE 1000 ML: 9 INJECTION, SOLUTION INTRAVENOUS at 11:58

## 2023-12-04 RX ADMIN — MORPHINE SULFATE 2 MG: 2 INJECTION, SOLUTION INTRAMUSCULAR; INTRAVENOUS at 11:52

## 2023-12-04 RX ADMIN — DEXAMETHASONE SODIUM PHOSPHATE 10 MG: 4 INJECTION, SOLUTION INTRAMUSCULAR; INTRAVENOUS at 11:25

## 2023-12-04 RX ADMIN — CEFTRIAXONE SODIUM 1000 MG: 1 INJECTION, POWDER, FOR SOLUTION INTRAMUSCULAR; INTRAVENOUS at 11:57

## 2023-12-04 RX ADMIN — GUANFACINE 2 MG: 1 TABLET ORAL at 22:27

## 2023-12-04 RX ADMIN — IPRATROPIUM BROMIDE AND ALBUTEROL SULFATE 1 DOSE: 2.5; .5 SOLUTION RESPIRATORY (INHALATION) at 23:42

## 2023-12-04 RX ADMIN — SODIUM CHLORIDE 1000 ML: 9 INJECTION, SOLUTION INTRAVENOUS at 14:12

## 2023-12-04 RX ADMIN — SODIUM CHLORIDE, PRESERVATIVE FREE 10 ML: 5 INJECTION INTRAVENOUS at 22:27

## 2023-12-04 RX ADMIN — INSULIN LISPRO 2 UNITS: 100 INJECTION, SOLUTION INTRAVENOUS; SUBCUTANEOUS at 18:10

## 2023-12-04 RX ADMIN — IPRATROPIUM BROMIDE AND ALBUTEROL SULFATE 1 DOSE: .5; 3 SOLUTION RESPIRATORY (INHALATION) at 20:55

## 2023-12-04 RX ADMIN — MORPHINE SULFATE 2 MG: 2 INJECTION, SOLUTION INTRAMUSCULAR; INTRAVENOUS at 10:31

## 2023-12-04 ASSESSMENT — PAIN SCALES - GENERAL: PAINLEVEL_OUTOF10: 6

## 2023-12-04 ASSESSMENT — PAIN - FUNCTIONAL ASSESSMENT: PAIN_FUNCTIONAL_ASSESSMENT: 0-10

## 2023-12-04 ASSESSMENT — LIFESTYLE VARIABLES: HOW OFTEN DO YOU HAVE A DRINK CONTAINING ALCOHOL: NEVER

## 2023-12-04 ASSESSMENT — PAIN DESCRIPTION - LOCATION: LOCATION: CHEST;ABDOMEN

## 2023-12-04 NOTE — ED PROVIDER NOTES
EMERGENCY DEPARTMENT ENCOUNTER        Pt Name: Sylvia Santana  MRN: [id number]  [address] [facility] [place] 1965  Date of evaluation: 12/4/2023  Provider: Pancho Mon MD  PCP: Landis Mom, [place]       Chief Complaint   Patient presents with    Shortness of Breath     Tachypnea and labored on arrival with EMS breathing in progress. 1 duoneb via EMS. Hx edema and HTN    Chest Pain     Sternal chest pain. States her ribs also hurt. Cough       HISTORY OFPRESENT ILLNESS   (Location/Symptom, Timing/Onset, Context/Setting, Quality, Duration, Modifying Factors,Severity)  Note limiting factors. Sylvia Santana is a 60 y.o. female presenting today due to concern for developing cough with shortness of breath starting 5 days ago on Thursday but starting to develop chest pain with breathing and coughing starting yesterday and becoming severely out of breath today where she was finally brought to the emergency department by EMS after trying to go to her primary doctor office. She was in severe respiratory distress on arrival and very anxious. She stated that she felt she could not catch her breath and if she laid flat she felt like she was drowning. She does not normally wear oxygen. She does have a history of hypertension and diabetes. She denies any prior history of smoking although does have a history of asthma. She did receive a DuoNeb by EMS prior to arrival with slight improvement of her symptoms. She has chronic history of leg swelling but denies any on the ordinary. She denies any new urinary complaints. No abdominal pain other than if she coughs her upper abdomen hurts. No falls or trauma. She does have some sinus congestion. Due to worsening shortness of breath, she was brought to the ED for further evaluation. She denies any history of blood clots. No hemoptysis.         REVIEW OF SYSTEMS    (2-9 systems for level 4, 10 or more for level 5)     Review of Systems

## 2023-12-04 NOTE — H&P
Hospital Medicine History & Physical      Date of Admission: 12/4/2023    Date of Service:  Pt seen/examined on 12/4/23     [x]Admitted to Inpatient with expected LOS greater than two midnights due to medical therapy. []Placed in Observation status. Chief Admission Complaint:  sob    Presenting Admission History:      60 y.o. female  with hx of dm2, obesity, MEHUL(on cpap) who presented to [facility] with sob and fatigue. Pt noted symptoms beginning last thurs with dry cough that became productive. She had assoc f/chills with temp of 101 at home. She denied myalgias/arthralgias. She also noted orthopnea(smothering feeling with laying flat) and PITTMAN for the past 1 week. No chest pain/pressure but rib pain due to coughing. She tried Everlean Lewis and old abx(took on Saturday) until seeing  PCP today. In the office, PCP noted o2 in the 80s, bp was 200s/100s, ht rate in the 100s, therefore called EMS.   -had assoc fatigue  -No n/v  -she notes chornic diarrhea  -No appetite  -pt feels similar to prior pna  -she tried home inhaler w/o relief  -she uses cpap( Pressure of 18, used since 1992)  Sometimes weeping on legs  -she has chronic Back pain  -she is Not on oxygen at home  -she does not see a pulmonologist      Assessment/Plan:      Current Principal Problem:  <principal problem not specified>    Sepsis- with tachycardia/tachypnea, fever of 101 at home, +pulm source  -Bcx  -Iv abx started 12/4  --given Ivfs  -Trended lactate  -Ua pending    Sob/hypoxia- transient, unclear etiology, CXR noted possible mass  -CT chest done, neg for PE but noted rt sided pna  -supp o2 if needed  -Duonebs scheduled  -Mucinex  -strep pna/legionella ordered  -Continued tenex  -Tessalon perles    DM2- per emr, with assoc neuropathy  -Ac/hs bs  -Held metformin  -Med SSI,  hypoglycemia protocol    HTN- elevated, 2/2 above  -continued arb    Leg swelling- chronic  -Held home lasix  --PCP office was to fax recent echo/stress test from (FLONASE) 50 MCG/ACT nasal spray 1 spray by Nasal route daily    Mike Matt MD   vitamin B-12 (CYANOCOBALAMIN) 500 MCG tablet Take 500 mcg by mouth daily    Mike Matt MD   Cholecalciferol (VITAMIN D3) 2000 UNITS CAPS Take by mouth    Mike Matt MD   Multiple Vitamins-Minerals (THERAPEUTIC MULTIVITAMIN-MINERALS) tablet Take 1 tablet by mouth daily Alive women's energy    Mike Matt MD   ibuprofen (ADVIL;MOTRIN) 400 MG tablet Take 400 mg by mouth every 6 hours as needed for Pain    Mike Matt MD   prednisoLONE acetate (PRED FORTE) 1 % ophthalmic suspension Place 1 drop into both eyes 4 times daily    Mike Matt MD   potassium chloride (KLOR-CON) 20 MEQ packet Take 20 mEq by mouth daily    Mike Matt MD   metFORMIN (GLUCOPHAGE) 500 MG tablet Take 500 mg by mouth 2 times daily (with meals)    Mike Matt MD   guanFACINE (TENEX) 1 MG tablet Take 2 mg by mouth 2 times daily    Mike Matt MD   albuterol (PROVENTIL HFA;VENTOLIN HFA) 108 (90 BASE) MCG/ACT inhaler Inhale 2 puffs into the lungs every 6 hours as needed for Wheezing    Mike Matt MD   Fish Oil-Cholecalciferol (FISH OIL + D3) 9085-1838 MG-UNIT CAPS Take by mouth See Admin Instructions 1 tab in am, 2 tab at night    Mike Matt MD   furosemide (LASIX) 80 MG tablet   Take 80 mg by mouth daily     Mike Matt MD   sertraline (ZOLOFT) 50 MG tablet   Take 100 mg by mouth daily     ProviderMike MD       Labs: Personally reviewed and interpreted for clinical significance. Recent Labs     12/04/23  1008   WBC 7.4   HGB 13.8   HCT 41.8        Recent Labs     12/04/23  1008      K 4.1   CL 92*   CO2 25   BUN 11   CREATININE 0.7   CALCIUM 10.1   MG 1.70*     Recent Labs     12/04/23  1008   PROBNP 169*   TROPHS 11     No results for input(s): \"LABA1C\" in the last 72 hours.   Recent Labs     12/04/23  1008   AST 61*   ALT

## 2023-12-04 NOTE — ED NOTES
Pt O2 continues to drop to 80s for several seconds, but then comes back up. Pt reporting significant SOB, placed on 2l NC. Pt currently sat 96%. Will continue to monitor.       Calista Harris RN  12/04/23 3925

## 2023-12-04 NOTE — PROGRESS NOTES
Admitted to [facility]. VSS. Assessment stable. O2 94% on 4L NC. Oriented to room. Call light in reach. Will monitor.

## 2023-12-05 ENCOUNTER — APPOINTMENT (OUTPATIENT)
Dept: CT IMAGING | Age: 58
End: 2023-12-05
Payer: COMMERCIAL

## 2023-12-05 LAB
ANION GAP SERPL CALCULATED.3IONS-SCNC: 15 MMOL/L (ref 3–16)
BASOPHILS # BLD: 0 K/UL (ref 0–0.2)
BASOPHILS NFR BLD: 0.6 %
BUN SERPL-MCNC: 14 MG/DL (ref 7–20)
CALCIUM SERPL-MCNC: 8.7 MG/DL (ref 8.3–10.6)
CHLORIDE SERPL-SCNC: 93 MMOL/L (ref 99–110)
CHOLEST SERPL-MCNC: 213 MG/DL (ref 0–199)
CO2 SERPL-SCNC: 23 MMOL/L (ref 21–32)
CREAT SERPL-MCNC: 0.7 MG/DL (ref 0.6–1.1)
DEPRECATED RDW RBC AUTO: 13.2 % (ref 12.4–15.4)
EOSINOPHIL # BLD: 0 K/UL (ref 0–0.6)
EOSINOPHIL NFR BLD: 0 %
GFR SERPLBLD CREATININE-BSD FMLA CKD-EPI: >60 ML/MIN/{1.73_M2}
GLUCOSE BLD-MCNC: 171 MG/DL (ref 70–99)
GLUCOSE BLD-MCNC: 205 MG/DL (ref 70–99)
GLUCOSE BLD-MCNC: 205 MG/DL (ref 70–99)
GLUCOSE BLD-MCNC: 214 MG/DL (ref 70–99)
GLUCOSE SERPL-MCNC: 222 MG/DL (ref 70–99)
HCT VFR BLD AUTO: 35.4 % (ref 36–48)
HDLC SERPL-MCNC: 38 MG/DL (ref 40–60)
HGB BLD-MCNC: 12 G/DL (ref 12–16)
LACTATE BLDV-SCNC: 2.9 MMOL/L (ref 0.4–2)
LACTATE BLDV-SCNC: 4 MMOL/L (ref 0.4–1.9)
LACTATE BLDV-SCNC: 4.2 MMOL/L (ref 0.4–2)
LDLC SERPL CALC-MCNC: 122 MG/DL
LEGIONELLA AG UR QL: NORMAL
LYMPHOCYTES # BLD: 0.7 K/UL (ref 1–5.1)
LYMPHOCYTES NFR BLD: 9.7 %
MCH RBC QN AUTO: 30.8 PG (ref 26–34)
MCHC RBC AUTO-ENTMCNC: 33.8 G/DL (ref 31–36)
MCV RBC AUTO: 90.9 FL (ref 80–100)
MONOCYTES # BLD: 0.9 K/UL (ref 0–1.3)
MONOCYTES NFR BLD: 11.5 %
NEUTROPHILS # BLD: 5.8 K/UL (ref 1.7–7.7)
NEUTROPHILS NFR BLD: 78.2 %
PERFORMED ON: ABNORMAL
PLATELET # BLD AUTO: 283 K/UL (ref 135–450)
PMV BLD AUTO: 7.1 FL (ref 5–10.5)
POTASSIUM SERPL-SCNC: 3.8 MMOL/L (ref 3.5–5.1)
RBC # BLD AUTO: 3.89 M/UL (ref 4–5.2)
S PNEUM AG UR QL: NORMAL
SODIUM SERPL-SCNC: 131 MMOL/L (ref 136–145)
TRIGL SERPL-MCNC: 264 MG/DL (ref 0–150)
VLDLC SERPL CALC-MCNC: 53 MG/DL
WBC # BLD AUTO: 7.5 K/UL (ref 4–11)

## 2023-12-05 PROCEDURE — 1200000000 HC SEMI PRIVATE

## 2023-12-05 PROCEDURE — 6360000004 HC RX CONTRAST MEDICATION: Performed by: INTERNAL MEDICINE

## 2023-12-05 PROCEDURE — 94640 AIRWAY INHALATION TREATMENT: CPT

## 2023-12-05 PROCEDURE — 6360000002 HC RX W HCPCS: Performed by: INTERNAL MEDICINE

## 2023-12-05 PROCEDURE — 80061 LIPID PANEL: CPT

## 2023-12-05 PROCEDURE — 6360000002 HC RX W HCPCS: Performed by: NURSE PRACTITIONER

## 2023-12-05 PROCEDURE — 36415 COLL VENOUS BLD VENIPUNCTURE: CPT

## 2023-12-05 PROCEDURE — 2580000003 HC RX 258: Performed by: INTERNAL MEDICINE

## 2023-12-05 PROCEDURE — 94761 N-INVAS EAR/PLS OXIMETRY MLT: CPT

## 2023-12-05 PROCEDURE — 85025 COMPLETE CBC W/AUTO DIFF WBC: CPT

## 2023-12-05 PROCEDURE — 6370000000 HC RX 637 (ALT 250 FOR IP): Performed by: NURSE PRACTITIONER

## 2023-12-05 PROCEDURE — 74177 CT ABD & PELVIS W/CONTRAST: CPT

## 2023-12-05 PROCEDURE — 6370000000 HC RX 637 (ALT 250 FOR IP): Performed by: INTERNAL MEDICINE

## 2023-12-05 PROCEDURE — 83605 ASSAY OF LACTIC ACID: CPT

## 2023-12-05 PROCEDURE — 2700000000 HC OXYGEN THERAPY PER DAY

## 2023-12-05 PROCEDURE — 94669 MECHANICAL CHEST WALL OSCILL: CPT

## 2023-12-05 PROCEDURE — 80048 BASIC METABOLIC PNL TOTAL CA: CPT

## 2023-12-05 RX ORDER — SODIUM CHLORIDE, SODIUM LACTATE, POTASSIUM CHLORIDE, CALCIUM CHLORIDE 600; 310; 30; 20 MG/100ML; MG/100ML; MG/100ML; MG/100ML
INJECTION, SOLUTION INTRAVENOUS CONTINUOUS
Status: ACTIVE | OUTPATIENT
Start: 2023-12-05 | End: 2023-12-06

## 2023-12-05 RX ORDER — TRAMADOL HYDROCHLORIDE 50 MG/1
50 TABLET ORAL EVERY 6 HOURS PRN
Status: DISCONTINUED | OUTPATIENT
Start: 2023-12-05 | End: 2023-12-14 | Stop reason: HOSPADM

## 2023-12-05 RX ORDER — IPRATROPIUM BROMIDE AND ALBUTEROL SULFATE 2.5; .5 MG/3ML; MG/3ML
1 SOLUTION RESPIRATORY (INHALATION)
Status: DISCONTINUED | OUTPATIENT
Start: 2023-12-05 | End: 2023-12-06

## 2023-12-05 RX ORDER — SODIUM CHLORIDE, SODIUM LACTATE, POTASSIUM CHLORIDE, AND CALCIUM CHLORIDE .6; .31; .03; .02 G/100ML; G/100ML; G/100ML; G/100ML
1000 INJECTION, SOLUTION INTRAVENOUS ONCE
Status: COMPLETED | OUTPATIENT
Start: 2023-12-05 | End: 2023-12-05

## 2023-12-05 RX ORDER — SODIUM CHLORIDE, SODIUM LACTATE, POTASSIUM CHLORIDE, AND CALCIUM CHLORIDE .6; .31; .03; .02 G/100ML; G/100ML; G/100ML; G/100ML
500 INJECTION, SOLUTION INTRAVENOUS ONCE
Status: COMPLETED | OUTPATIENT
Start: 2023-12-05 | End: 2023-12-05

## 2023-12-05 RX ORDER — DEXAMETHASONE 1 MG
1 TABLET ORAL EVERY 12 HOURS SCHEDULED
Status: DISCONTINUED | OUTPATIENT
Start: 2023-12-05 | End: 2023-12-07

## 2023-12-05 RX ORDER — HYDROCODONE BITARTRATE AND HOMATROPINE METHYLBROMIDE ORAL SOLUTION 5; 1.5 MG/5ML; MG/5ML
5 LIQUID ORAL EVERY 4 HOURS PRN
Status: COMPLETED | OUTPATIENT
Start: 2023-12-05 | End: 2023-12-10

## 2023-12-05 RX ADMIN — IOPAMIDOL 75 ML: 755 INJECTION, SOLUTION INTRAVENOUS at 16:44

## 2023-12-05 RX ADMIN — SODIUM CHLORIDE, POTASSIUM CHLORIDE, SODIUM LACTATE AND CALCIUM CHLORIDE 1000 ML: 600; 310; 30; 20 INJECTION, SOLUTION INTRAVENOUS at 08:50

## 2023-12-05 RX ADMIN — BENZONATATE 100 MG: 100 CAPSULE ORAL at 02:10

## 2023-12-05 RX ADMIN — HYDROCODONE BITARTRATE AND HOMATROPINE METHYLBROMIDE 5 ML: 5; 1.5 SOLUTION ORAL at 18:58

## 2023-12-05 RX ADMIN — TRAMADOL HYDROCHLORIDE 50 MG: 50 TABLET ORAL at 20:54

## 2023-12-05 RX ADMIN — IPRATROPIUM BROMIDE AND ALBUTEROL SULFATE 1 DOSE: 2.5; .5 SOLUTION RESPIRATORY (INHALATION) at 07:06

## 2023-12-05 RX ADMIN — ACETAMINOPHEN 650 MG: 325 TABLET, FILM COATED ORAL at 02:10

## 2023-12-05 RX ADMIN — SERTRALINE 100 MG: 50 TABLET, FILM COATED ORAL at 20:52

## 2023-12-05 RX ADMIN — DEXAMETHASONE 1 MG: 1 TABLET ORAL at 22:38

## 2023-12-05 RX ADMIN — Medication 1 TABLET: at 08:56

## 2023-12-05 RX ADMIN — HYDROCODONE BITARTRATE AND HOMATROPINE METHYLBROMIDE 5 ML: 5; 1.5 SOLUTION ORAL at 07:09

## 2023-12-05 RX ADMIN — IPRATROPIUM BROMIDE AND ALBUTEROL SULFATE 1 DOSE: 2.5; .5 SOLUTION RESPIRATORY (INHALATION) at 19:48

## 2023-12-05 RX ADMIN — DIATRIZOATE MEGLUMINE AND DIATRIZOATE SODIUM 12 ML: 660; 100 LIQUID ORAL; RECTAL at 15:24

## 2023-12-05 RX ADMIN — CYANOCOBALAMIN TAB 500 MCG 500 MCG: 500 TAB at 08:56

## 2023-12-05 RX ADMIN — INSULIN LISPRO 2 UNITS: 100 INJECTION, SOLUTION INTRAVENOUS; SUBCUTANEOUS at 12:01

## 2023-12-05 RX ADMIN — SODIUM CHLORIDE, POTASSIUM CHLORIDE, SODIUM LACTATE AND CALCIUM CHLORIDE: 600; 310; 30; 20 INJECTION, SOLUTION INTRAVENOUS at 20:58

## 2023-12-05 RX ADMIN — ACETAMINOPHEN 650 MG: 325 TABLET, FILM COATED ORAL at 13:34

## 2023-12-05 RX ADMIN — IPRATROPIUM BROMIDE AND ALBUTEROL SULFATE 1 DOSE: 2.5; .5 SOLUTION RESPIRATORY (INHALATION) at 03:32

## 2023-12-05 RX ADMIN — SODIUM CHLORIDE, POTASSIUM CHLORIDE, SODIUM LACTATE AND CALCIUM CHLORIDE 500 ML: 600; 310; 30; 20 INJECTION, SOLUTION INTRAVENOUS at 18:54

## 2023-12-05 RX ADMIN — BENZONATATE 100 MG: 100 CAPSULE ORAL at 12:03

## 2023-12-05 RX ADMIN — HYDROCODONE BITARTRATE AND HOMATROPINE METHYLBROMIDE 5 ML: 5; 1.5 SOLUTION ORAL at 03:00

## 2023-12-05 RX ADMIN — INSULIN LISPRO 2 UNITS: 100 INJECTION, SOLUTION INTRAVENOUS; SUBCUTANEOUS at 08:55

## 2023-12-05 RX ADMIN — AZITHROMYCIN MONOHYDRATE 500 MG: 500 INJECTION, POWDER, LYOPHILIZED, FOR SOLUTION INTRAVENOUS at 11:57

## 2023-12-05 RX ADMIN — GUANFACINE 2 MG: 1 TABLET ORAL at 08:55

## 2023-12-05 RX ADMIN — HYDROCODONE BITARTRATE AND HOMATROPINE METHYLBROMIDE 5 ML: 5; 1.5 SOLUTION ORAL at 12:00

## 2023-12-05 RX ADMIN — CEFTRIAXONE SODIUM 1000 MG: 1 INJECTION, POWDER, FOR SOLUTION INTRAMUSCULAR; INTRAVENOUS at 13:34

## 2023-12-05 RX ADMIN — IPRATROPIUM BROMIDE AND ALBUTEROL SULFATE 1 DOSE: 2.5; .5 SOLUTION RESPIRATORY (INHALATION) at 11:25

## 2023-12-05 RX ADMIN — ENOXAPARIN SODIUM 40 MG: 100 INJECTION SUBCUTANEOUS at 20:53

## 2023-12-05 RX ADMIN — IPRATROPIUM BROMIDE AND ALBUTEROL SULFATE 1 DOSE: 2.5; .5 SOLUTION RESPIRATORY (INHALATION) at 16:14

## 2023-12-05 RX ADMIN — GUANFACINE 2 MG: 1 TABLET ORAL at 20:52

## 2023-12-05 RX ADMIN — ENOXAPARIN SODIUM 40 MG: 100 INJECTION SUBCUTANEOUS at 08:55

## 2023-12-05 ASSESSMENT — PAIN SCALES - GENERAL
PAINLEVEL_OUTOF10: 10
PAINLEVEL_OUTOF10: 9
PAINLEVEL_OUTOF10: 7

## 2023-12-05 ASSESSMENT — PAIN DESCRIPTION - DESCRIPTORS
DESCRIPTORS: SHARP;ACHING
DESCRIPTORS: ACHING;SHARP

## 2023-12-05 ASSESSMENT — PAIN DESCRIPTION - LOCATION
LOCATION: HEAD;RIB CAGE
LOCATION: HEAD;RIB CAGE

## 2023-12-05 NOTE — CARE COORDINATION
Case Management Assessment  Initial Evaluation    Date/Time of Evaluation: 12/5/2023 3:42 PM  Assessment Completed by: Cheryl Henderson RN    If patient is discharged prior to next notation, then this note serves as note for discharge by case management. Patient Name: Rea Dacosta                   YOB: 1965  Diagnosis: Acute respiratory distress [R06.03]  Lactic acidosis [E87.20]  Hypoxia [R09.02]  COPD exacerbation ([address]) [J44.1]  Sepsis ([address]) [A41.9]  Severe sepsis ([address]) [A41.9, R65.20]  Pneumonia of right lower lobe due to infectious organism [J18.9]                   Date / Time: 12/4/2023  9:59 AM    Patient Admission Status: Inpatient   Readmission Risk (Low < 19, Mod (19-27), High > 27): Readmission Risk Score: 8.6    Current PCP: Neetu Berkowitz MD  PCP verified by CM? Yes    Chart Reviewed: Yes      History Provided by: Patient  Patient Orientation: Alert and Oriented, Person, Place, Situation, Self    Patient Cognition: Alert    Hospitalization in the last 30 days (Readmission):  No    If yes, Readmission Assessment in  Navigator will be completed. Advance Directives:      Code Status: Full Code   Patient's Primary Decision Maker is: Legal Next of Kin      Discharge Planning:    Patient lives with: Alone Type of Home: Apartment  Primary Care Giver: Self  Patient Support Systems include: Family Members, Friends/Neighbors   Current Financial resources: None  Current community resources: None  Current services prior to admission: None            Current DME:              Type of Home Care services:  None    ADLS  Prior functional level: Independent in ADLs/IADLs  Current functional level:      PT AM-PAC:   /24  OT AM-PAC:   /24    Family can provide assistance at DC: No  Would you like Case Management to discuss the discharge plan with any other family members/significant others, and if so, who?  No  Plans to Return to Present Housing: Unknown at present  Other Identified Issues/Barriers to

## 2023-12-05 NOTE — PROGRESS NOTES
Pt a/o. VSS. Pt has a very persistent dry cough. Lungs with expiratory wheezes. Pt with healthy appetite. BS active and pt denied n/v. Pt is noticeably SOB even while just sitting in bed and especially when walking. Pt titrated up to 4L NC per MD. Pt stable and denied needs when writer left room.

## 2023-12-05 NOTE — PROGRESS NOTES
Lactic critical high @ 4.8. Spoke of case to on call MD; new orders for rpt lactic and BMP at 2200 tonight.

## 2023-12-05 NOTE — PROGRESS NOTES
4 Eyes Skin Assessment     The patient is being assess for  Admission    I agree that 2 RN's have performed a thorough Head to Toe Skin Assessment on the patient. ALL assessment sites listed below have been assessed. Areas assessed by both nurses: LV\MB  [x]   Head, Face, and Ears   [x]   Shoulders, Back, and Chest  [x]   Arms, Elbows, and Hands   [x]   Coccyx, Sacrum, and IschIum  [x]   Legs, Feet, and Heels        Does the Patient have Skin Breakdown?   NoNo         Jesus Prevention initiated:  No   Wound Care Orders initiated:  NA      [facility] nurse consulted for Pressure Injury (Stage 3,4, Unstageable, DTI, NWPT, and Complex wounds), New and Established Ostomies:  NA      Nurse 1 eSignature: Electronically signed by Maddie Snider RN on 12/4/23 at 8:24 PM EST    **SHARE this note so that the co-signing nurse is able to place an eSignature**    Nurse 2 eSignature: {Esignature:[id number]}

## 2023-12-05 NOTE — PLAN OF CARE
Problem: Safety - Adult  Goal: Free from fall injury  Outcome: Progressing     Pt a/o and verbalized understanding of calling for assistance. Bed locked and in lowest position with bedside table and call light within reach. Room well it and free of clutter.

## 2023-12-05 NOTE — PROGRESS NOTES
Hospital Medicine Progress Note      Date of Admission: 12/4/2023  Hospital Day: 2    Chief Admission Complaint:  sob     Subjective:   notes ongoing sob, winded when walking to bathroom, inc'd o2 to 4L from 2L    Presenting Admission History:         60 y.o. female  with hx of dm2, obesity, MEHUL(on cpap) who presented to [facility] with sob and fatigue. Pt noted symptoms beginning last thurs with dry cough that became productive. She had assoc f/chills with temp of 101 at home. She denied myalgias/arthralgias. She also noted orthopnea(smothering feeling with laying flat) and PITTMAN for the past 1 week. No chest pain/pressure but rib pain due to coughing. She tried [place] [facility] and old abx(took on Saturday) until seeing  PCP today. In the office, PCP noted o2 in the 80s, bp was 200s/100s, ht rate in the 100s, therefore called EMS.   -had assoc fatigue  -No n/v  -she notes chornic diarrhea  -No appetite  -pt feels similar to prior pna  -she tried home inhaler w/o relief  -she uses cpap( Pressure of 18, used since 1992)  Sometimes weeping on legs  -she has chronic Back pain  -she is Not on oxygen at home  -she does not see a pulmonologist    Assessment/Plan:      Current Principal Problem:  Sepsis ([address])      Sepsis- with tachycardia/tachypnea, fever of 101 at home, +pulm source  -Bcx ngtd  -Iv abx started 12/4  --given Ivfs  -Trended lactate  -Ua pending     Sob/hypoxia- transient, unclear etiology, CXR noted possible mass  -CT chest done, neg for PE but noted rt sided pna  -supp o2 if needed  -Duonebs scheduled  -Mucinex  -strep pna/legionella ordered and neg  -Continued tenex  -Tessalon perles     Lactic acidosis- persistent  -Cta/p ordered 12/5  -ivf given  -trended labs    DM2- per emr, with assoc neuropathy  -Ac/hs bs  -Held metformin  -Med SSI,  hypoglycemia protocol     HTN- elevated, 2/2 above  -continued arb     Leg swelling- chronic  -Held home lasix  --PCP office was to fax recent echo/stress test from Unit in AM pending clinical course overnight: []Yes  [x]No    ------------------------------------------------------------------------------------------------------------------------------------------------------------------------    MDM      [x] High (any 2)    A. Problems (any 1)  [x] Acute/Chronic Illness/injury posing threat to life or bodily function:    [] Severe exacerbation of chronic illness:    ---------------------------------------------------------------------  B. Risk of Treatment (any 1)   [] Drugs/treatments that require intensive monitoring for toxicity include:    [] Change in code status:    [] Decision to escalate care:    [] Major surgery/procedure with associated risk factors:    ----------------------------------------------------------------------  C. Data (any 2)  [x] Discussed current management and discharge planning options with Case Management. [] Discussed management of the case with:    [] Telemetry personally reviewed and interpreted as documented above    [] Imaging personally reviewed and interpreted, includes:    [x] Data Review (any 3)  [] Collateral history obtained from:    [x] All available Consultant notes from yesterday/today were reviewed  [x] All current labs were reviewed and interpreted for clinical significance   [x] Appropriate follow-up labs were ordered    Medications:  Personally reviewed in detail in conjunction w/ labs as documented for evidence of drug toxicity.      Infusion Medications    lactated ringers IV soln      sodium chloride      dextrose       Scheduled Medications    ipratropium 0.5 mg-albuterol 2.5 mg  1 Dose Inhalation Q4H WA RT    lactated ringers bolus  500 mL IntraVENous Once    guanFACINE  2 mg Oral BID    sertraline  100 mg Oral QHS    vitamin B-12  500 mcg Oral Daily    therapeutic multivitamin-minerals  1 tablet Oral Daily    sodium chloride flush  5-40 mL IntraVENous 2 times per day    enoxaparin  40 mg SubCUTAneous BID    cefTRIAXone

## 2023-12-06 LAB
ANION GAP SERPL CALCULATED.3IONS-SCNC: 12 MMOL/L (ref 3–16)
BASOPHILS # BLD: 0.1 K/UL (ref 0–0.2)
BASOPHILS NFR BLD: 0.6 %
BUN SERPL-MCNC: 12 MG/DL (ref 7–20)
CALCIUM SERPL-MCNC: 8.6 MG/DL (ref 8.3–10.6)
CHLORIDE SERPL-SCNC: 96 MMOL/L (ref 99–110)
CO2 SERPL-SCNC: 24 MMOL/L (ref 21–32)
CREAT SERPL-MCNC: <0.5 MG/DL (ref 0.6–1.1)
DEPRECATED RDW RBC AUTO: 13.2 % (ref 12.4–15.4)
EOSINOPHIL # BLD: 0.1 K/UL (ref 0–0.6)
EOSINOPHIL NFR BLD: 1 %
GFR SERPLBLD CREATININE-BSD FMLA CKD-EPI: >60 ML/MIN/{1.73_M2}
GLUCOSE BLD-MCNC: 173 MG/DL (ref 70–99)
GLUCOSE BLD-MCNC: 206 MG/DL (ref 70–99)
GLUCOSE BLD-MCNC: 217 MG/DL (ref 70–99)
GLUCOSE BLD-MCNC: 229 MG/DL (ref 70–99)
GLUCOSE SERPL-MCNC: 205 MG/DL (ref 70–99)
HCT VFR BLD AUTO: 34.2 % (ref 36–48)
HGB BLD-MCNC: 11.5 G/DL (ref 12–16)
LACTATE BLDV-SCNC: 1.5 MMOL/L (ref 0.4–2)
LACTATE BLDV-SCNC: 1.7 MMOL/L (ref 0.4–2)
LACTATE BLDV-SCNC: 2 MMOL/L (ref 0.4–2)
LYMPHOCYTES # BLD: 1 K/UL (ref 1–5.1)
LYMPHOCYTES NFR BLD: 10.5 %
MCH RBC QN AUTO: 30.8 PG (ref 26–34)
MCHC RBC AUTO-ENTMCNC: 33.7 G/DL (ref 31–36)
MCV RBC AUTO: 91.2 FL (ref 80–100)
MONOCYTES # BLD: 1 K/UL (ref 0–1.3)
MONOCYTES NFR BLD: 11 %
NEUTROPHILS # BLD: 7.3 K/UL (ref 1.7–7.7)
NEUTROPHILS NFR BLD: 76.9 %
PERFORMED ON: ABNORMAL
PLATELET # BLD AUTO: 271 K/UL (ref 135–450)
PMV BLD AUTO: 7.3 FL (ref 5–10.5)
POTASSIUM SERPL-SCNC: 4.1 MMOL/L (ref 3.5–5.1)
RBC # BLD AUTO: 3.75 M/UL (ref 4–5.2)
SODIUM SERPL-SCNC: 132 MMOL/L (ref 136–145)
WBC # BLD AUTO: 9.5 K/UL (ref 4–11)

## 2023-12-06 PROCEDURE — 94660 CPAP INITIATION&MGMT: CPT

## 2023-12-06 PROCEDURE — 6360000002 HC RX W HCPCS: Performed by: NURSE PRACTITIONER

## 2023-12-06 PROCEDURE — 97162 PT EVAL MOD COMPLEX 30 MIN: CPT

## 2023-12-06 PROCEDURE — 36415 COLL VENOUS BLD VENIPUNCTURE: CPT

## 2023-12-06 PROCEDURE — 94640 AIRWAY INHALATION TREATMENT: CPT

## 2023-12-06 PROCEDURE — 85025 COMPLETE CBC W/AUTO DIFF WBC: CPT

## 2023-12-06 PROCEDURE — 2580000003 HC RX 258: Performed by: INTERNAL MEDICINE

## 2023-12-06 PROCEDURE — 97530 THERAPEUTIC ACTIVITIES: CPT

## 2023-12-06 PROCEDURE — 6360000002 HC RX W HCPCS: Performed by: INTERNAL MEDICINE

## 2023-12-06 PROCEDURE — 2700000000 HC OXYGEN THERAPY PER DAY

## 2023-12-06 PROCEDURE — 97166 OT EVAL MOD COMPLEX 45 MIN: CPT

## 2023-12-06 PROCEDURE — 97116 GAIT TRAINING THERAPY: CPT

## 2023-12-06 PROCEDURE — 83605 ASSAY OF LACTIC ACID: CPT

## 2023-12-06 PROCEDURE — 80048 BASIC METABOLIC PNL TOTAL CA: CPT

## 2023-12-06 PROCEDURE — 94669 MECHANICAL CHEST WALL OSCILL: CPT

## 2023-12-06 PROCEDURE — 6370000000 HC RX 637 (ALT 250 FOR IP): Performed by: INTERNAL MEDICINE

## 2023-12-06 PROCEDURE — 1200000000 HC SEMI PRIVATE

## 2023-12-06 PROCEDURE — 94761 N-INVAS EAR/PLS OXIMETRY MLT: CPT

## 2023-12-06 RX ORDER — ALBUTEROL SULFATE 90 UG/1
2 AEROSOL, METERED RESPIRATORY (INHALATION)
Status: DISCONTINUED | OUTPATIENT
Start: 2023-12-06 | End: 2023-12-07

## 2023-12-06 RX ORDER — ALBUTEROL SULFATE 90 UG/1
2 AEROSOL, METERED RESPIRATORY (INHALATION) EVERY 4 HOURS PRN
Status: DISCONTINUED | OUTPATIENT
Start: 2023-12-06 | End: 2023-12-06

## 2023-12-06 RX ORDER — IPRATROPIUM BROMIDE AND ALBUTEROL SULFATE 2.5; .5 MG/3ML; MG/3ML
1 SOLUTION RESPIRATORY (INHALATION) EVERY 4 HOURS PRN
Status: DISCONTINUED | OUTPATIENT
Start: 2023-12-06 | End: 2023-12-12

## 2023-12-06 RX ADMIN — Medication 1 TABLET: at 10:27

## 2023-12-06 RX ADMIN — ACETAMINOPHEN 650 MG: 325 TABLET, FILM COATED ORAL at 21:40

## 2023-12-06 RX ADMIN — DEXAMETHASONE 1 MG: 1 TABLET ORAL at 10:27

## 2023-12-06 RX ADMIN — IPRATROPIUM BROMIDE AND ALBUTEROL SULFATE 1 DOSE: 2.5; .5 SOLUTION RESPIRATORY (INHALATION) at 07:44

## 2023-12-06 RX ADMIN — ENOXAPARIN SODIUM 40 MG: 100 INJECTION SUBCUTANEOUS at 21:36

## 2023-12-06 RX ADMIN — Medication 1 LOZENGE: at 19:13

## 2023-12-06 RX ADMIN — ENOXAPARIN SODIUM 40 MG: 100 INJECTION SUBCUTANEOUS at 10:33

## 2023-12-06 RX ADMIN — AZITHROMYCIN MONOHYDRATE 500 MG: 500 INJECTION, POWDER, LYOPHILIZED, FOR SOLUTION INTRAVENOUS at 13:09

## 2023-12-06 RX ADMIN — GUANFACINE 2 MG: 1 TABLET ORAL at 10:27

## 2023-12-06 RX ADMIN — DEXAMETHASONE 1 MG: 1 TABLET ORAL at 21:35

## 2023-12-06 RX ADMIN — CEFTRIAXONE SODIUM 1000 MG: 1 INJECTION, POWDER, FOR SOLUTION INTRAMUSCULAR; INTRAVENOUS at 10:40

## 2023-12-06 RX ADMIN — INSULIN LISPRO 2 UNITS: 100 INJECTION, SOLUTION INTRAVENOUS; SUBCUTANEOUS at 12:40

## 2023-12-06 RX ADMIN — SODIUM CHLORIDE, PRESERVATIVE FREE 10 ML: 5 INJECTION INTRAVENOUS at 21:41

## 2023-12-06 RX ADMIN — BENZONATATE 100 MG: 100 CAPSULE ORAL at 03:21

## 2023-12-06 RX ADMIN — INSULIN LISPRO 2 UNITS: 100 INJECTION, SOLUTION INTRAVENOUS; SUBCUTANEOUS at 19:12

## 2023-12-06 RX ADMIN — Medication 1 LOZENGE: at 12:30

## 2023-12-06 RX ADMIN — Medication 1 LOZENGE: at 21:41

## 2023-12-06 RX ADMIN — HYDROCODONE BITARTRATE AND HOMATROPINE METHYLBROMIDE 5 ML: 5; 1.5 SOLUTION ORAL at 21:40

## 2023-12-06 RX ADMIN — SERTRALINE 100 MG: 50 TABLET, FILM COATED ORAL at 21:35

## 2023-12-06 RX ADMIN — ALBUTEROL SULFATE 2 PUFF: 90 AEROSOL, METERED RESPIRATORY (INHALATION) at 21:29

## 2023-12-06 RX ADMIN — GUANFACINE 2 MG: 1 TABLET ORAL at 21:35

## 2023-12-06 RX ADMIN — ALBUTEROL SULFATE 2 PUFF: 90 AEROSOL, METERED RESPIRATORY (INHALATION) at 16:01

## 2023-12-06 RX ADMIN — IPRATROPIUM BROMIDE AND ALBUTEROL SULFATE 1 DOSE: 2.5; .5 SOLUTION RESPIRATORY (INHALATION) at 11:33

## 2023-12-06 RX ADMIN — CYANOCOBALAMIN TAB 500 MCG 500 MCG: 500 TAB at 10:27

## 2023-12-06 ASSESSMENT — PAIN SCALES - GENERAL
PAINLEVEL_OUTOF10: 8
PAINLEVEL_OUTOF10: 9

## 2023-12-06 ASSESSMENT — PAIN DESCRIPTION - ORIENTATION: ORIENTATION: RIGHT;LEFT

## 2023-12-06 ASSESSMENT — PAIN DESCRIPTION - LOCATION
LOCATION: RIB CAGE
LOCATION: THROAT
LOCATION: THROAT

## 2023-12-06 ASSESSMENT — PAIN DESCRIPTION - DESCRIPTORS: DESCRIPTORS: ACHING;SHARP

## 2023-12-06 ASSESSMENT — PAIN - FUNCTIONAL ASSESSMENT: PAIN_FUNCTIONAL_ASSESSMENT: PREVENTS OR INTERFERES SOME ACTIVE ACTIVITIES AND ADLS

## 2023-12-06 NOTE — PLAN OF CARE
Problem: Pain  Goal: Verbalizes/displays adequate comfort level or baseline comfort level  12/6/2023 0735 by Pete Bertrand RN  Outcome: Progressing  Flowsheets (Taken 12/6/2023 0735)  Verbalizes/displays adequate comfort level or baseline comfort level:   Encourage patient to monitor pain and request assistance   Assess pain using appropriate pain scale   Administer analgesics based on type and severity of pain and evaluate response   Implement non-pharmacological measures as appropriate and evaluate response   Consider cultural and social influences on pain and pain management   Notify Licensed Independent Practitioner if interventions unsuccessful or patient reports new pain  12/5/2023 2108 by Adolfo Mary RN  Outcome: Progressing     Problem: Safety - Adult  Goal: Free from fall injury  12/6/2023 0735 by Pete Bertrand RN  Outcome: Progressing  Flowsheets (Taken 12/6/2023 0735)  Free From Fall Injury: Instruct family/caregiver on patient safety  12/5/2023 2108 by Adolfo Mary RN  Outcome: Progressing     Problem: ABCDS Injury Assessment  Goal: Absence of physical injury  Outcome: Progressing  Flowsheets (Taken 12/6/2023 0735)  Absence of Physical Injury: Implement safety measures based on patient assessment

## 2023-12-06 NOTE — PROGRESS NOTES
Physical Therapy  Facility/Department: [facility] C3 TELE/MED SURG/ONC  Physical Therapy Initial Assessment    Name: Lynnette Shane  : 1965  MRN: [id number]  Date of Service: 2023    Discharge Recommendations:  Home with assist PRN, Home with Home health PT   PT Equipment Recommendations  Other: CTA - pt may benefit from RW at D/C if deficits persist      Patient Diagnosis(es): The primary encounter diagnosis was Acute respiratory failure with hypoxia ([address]). Diagnoses of Acute respiratory distress, Lactic acidosis, Hypoxia, COPD exacerbation (HCC), Pneumonia of right lower lobe due to infectious organism, and Severe sepsis [facility]) were also pertinent to this visit. Past Medical History:  has a past medical history of Anxiety, Asthma, CPAP rhinitis, Diabetes mellitus ([address]), Hyperlipidemia, Hypertension, MEHUL on CPAP, and Swelling of extremity. Past Surgical History:  has a past surgical history that includes ovarian cyst removal; Ovary removal; Cholecystectomy; and Carpal tunnel release. Assessment   Body Structures, Functions, Activity Limitations Requiring Skilled Therapeutic Intervention: Decreased functional mobility ; Decreased strength;Decreased endurance;Decreased balance;Decreased posture  Assessment: Pt referred for PT evaluation during current hospital stay with dx of acute resp. distress. Pt currently functioning below baseline, requiring supplemental O2 to maintain resting SpO2 levels >90% and CGA x 1 with support of RW for safe transfers and amb. Endurance/activity tolerance much more limited than at baseline. Recommend pt return home with assist PRN and home PT services.   Treatment Diagnosis: Decreased endurance  Specific Instructions for Next Treatment: Progress ther ex and mobility as tolerated  Therapy Prognosis: Good  Decision Making: Medium Complexity  Requires PT Follow-Up: Yes  Activity Tolerance: Patient tolerated evaluation without incident;Patient tolerated treatment well;Patient

## 2023-12-06 NOTE — PROGRESS NOTES
Hospital Medicine Progress Note      Date of Admission: 12/4/2023  Hospital Day: 3    Chief Admission Complaint:  sob     Subjective:  still notes weakness/sob, down to 2L, coughing ongoing, unable to expectorate, decadron po started overnight    Presenting Admission History:         60 y.o. female  with hx of dm2, obesity, MEHUL(on cpap) who presented to [facility] with sob and fatigue. Pt noted symptoms beginning last thurs with dry cough that became productive. She had assoc f/chills with temp of 101 at home. She denied myalgias/arthralgias. She also noted orthopnea(smothering feeling with laying flat) and PITTMAN for the past 1 week. No chest pain/pressure but rib pain due to coughing. She tried [place] [facility] and old abx(took on Saturday) until seeing  PCP today. In the office, PCP noted o2 in the 80s, bp was 200s/100s, ht rate in the 100s, therefore called EMS.   -had assoc fatigue  -No n/v  -she notes chornic diarrhea  -No appetite  -pt feels similar to prior pna  -she tried home inhaler w/o relief  -she uses cpap( Pressure of 18, used since 1992)  Sometimes weeping on legs  -she has chronic Back pain  -she is Not on oxygen at home  -she does not see a pulmonologist    Assessment/Plan:      Current Principal Problem:  Sepsis ([address])      Sepsis- with tachycardia/tachypnea, fever of 101 at home, +pulm source  -Bcx ngtd  -Iv abx started 12/4  --given Ivfs  -Trended lactate and normalized  -Ua was ordered in ER, no infection noted     Sob/hypoxia- transient, unclear etiology, CXR noted possible mass  -CT chest done, neg for PE but noted rt sided pna  -supp o2 if needed  -Duonebs scheduled  -Mucinex  -strep pna/legionella ordered and neg  -Continued tenex  -Tessalon perles   -pulm consulted 12/6 given lack of signif improvement, apprec recs  -decadron po started 12/5 pm    Lactic acidosis- persistent but now resolved as of 12/6  -Cta/p ordered 12/5  -ivf given  -trended labs     DM2- per emr, with assoc will be called.      Organism: No results found for: \"ORG\"      Nohelia Wing MD

## 2023-12-06 NOTE — PROGRESS NOTES
12/06/23 0021   NIV Type   $NIV $Daily Charge   NIV Started/Stopped   (pt found on bipap)   Equipment Type v60   Mode Bilevel   Mask Type Full face mask   Mask Size Small   Assessment   Respirations 21   SpO2 99 %   Comfort Level Good   Using Accessory Muscles No   Mask Compliance Good   Settings/Measurements   PIP Observed 16 cm H20   IPAP 15 cmH20   CPAP/EPAP 5 cmH2O   Vt (Measured) 715 mL   Rate Ordered 18   FiO2  40 %   I Time/ I Time % 1 s   Minute Volume (L/min) 13.1 Liters   Mask Leak (lpm) 20 lpm   Patient's Home Machine No   Alarm Settings   Alarms On Y   Low Pressure (cmH2O) 6 cmH2O   High Pressure (cmH2O) 30 cmH2O   Apnea (secs) 20 secs   RR Low (bpm) 6   RR High (bpm) 40 br/min

## 2023-12-06 NOTE — PROGRESS NOTES
Occupational Therapy  Facility/Department: [facility] C3 TELE/MED SURG/ONC  Occupational Therapy Initial Assessment/Treatment Note    Name: Will Mckeon  : 1965  MRN: [id number]  Date of Service: 2023    Discharge Recommendations:  Home with assist PRN  OT Equipment Recommendations  Equipment Needed: No    If pt is unable to be seen after this session, please let this note serve as discharge summary. Please see case management note for discharge disposition. Thank you. Patient Diagnosis(es): The primary encounter diagnosis was Acute respiratory failure with hypoxia ([address]). Diagnoses of Acute respiratory distress, Lactic acidosis, Hypoxia, COPD exacerbation (HCC), Pneumonia of right lower lobe due to infectious organism, and Severe sepsis [facility]) were also pertinent to this visit. Past Medical History:  has a past medical history of Anxiety, Asthma, CPAP rhinitis, Diabetes mellitus ([address]), Hyperlipidemia, Hypertension, MEHUL on CPAP, and Swelling of extremity. Past Surgical History:  has a past surgical history that includes ovarian cyst removal; Ovary removal; Cholecystectomy; and Carpal tunnel release. Treatment Diagnosis: Sepsis    Assessment   Performance deficits / Impairments: Decreased functional mobility ; Decreased balance;Decreased endurance;Decreased strength  Assessment: Pt admitted to [facility] for sepsis. Pt from home alone, ITPA, works fulltime (3rd shift), drives, and completes all IADLs IND. Pt declined therapy eval at first, however with vb encouragement and education on role of therapy, pt agreed to participate in therapy. Pt completed functional mobility with SBA and RW from couch > chair. Pt completed multiple sit to stands with RW and SBA. Recommend home with assist PRN, continue OT POC while in acute care.   Treatment Diagnosis: Sepsis  Prognosis: Fair  Decision Making: Medium Complexity  REQUIRES OT FOLLOW-UP: Yes  Activity Tolerance  Activity Tolerance: Patient Tolerated treatment well

## 2023-12-06 NOTE — PLAN OF CARE
Problem: Pain  Goal: Verbalizes/displays adequate comfort level or baseline comfort level  Outcome: Progressing     Problem: Safety - Adult  Goal: Free from fall injury  12/5/2023 2108 by Jonas Alcala RN  Outcome: Progressing  12/5/2023 1023 by Arlette Shaw RN  Outcome: Progressing

## 2023-12-06 NOTE — PROGRESS NOTES
Pt states that respiratory treatments are not helping her. Pt brought Albuterol inhaler from home and wishes to use during admission. Secure message sent to Dr. Mary Ogden to address.

## 2023-12-06 NOTE — CONSULTS
Consult Placed   Added to the treatment team  Who: Dr. Murali Elizondo  Date:12/6/2023    Time:1:29PM     Electronically signed by Kisha Montero on 12/6/2023 at 1:29 PM

## 2023-12-06 NOTE — PROGRESS NOTES
Pt A/O x 4, VSS. Pt stating she feels \"worse than yesterday. I'm not sure I'm going to make it out of here. \" Secure message sent to Dr. Noman Patterson. Turned pt O2 up to 3L. Pt now 91-92% on 3L NC. /83, Pulse 102, RR 22. Currently awaiting response.

## 2023-12-07 LAB
ANION GAP SERPL CALCULATED.3IONS-SCNC: 12 MMOL/L (ref 3–16)
BASOPHILS # BLD: 0.1 K/UL (ref 0–0.2)
BASOPHILS NFR BLD: 0.7 %
BUN SERPL-MCNC: 13 MG/DL (ref 7–20)
CALCIUM SERPL-MCNC: 9.4 MG/DL (ref 8.3–10.6)
CHLORIDE SERPL-SCNC: 97 MMOL/L (ref 99–110)
CO2 SERPL-SCNC: 27 MMOL/L (ref 21–32)
CREAT SERPL-MCNC: <0.5 MG/DL (ref 0.6–1.1)
DEPRECATED RDW RBC AUTO: 13.1 % (ref 12.4–15.4)
EOSINOPHIL # BLD: 0 K/UL (ref 0–0.6)
EOSINOPHIL NFR BLD: 0.5 %
GFR SERPLBLD CREATININE-BSD FMLA CKD-EPI: >60 ML/MIN/{1.73_M2}
GLUCOSE BLD-MCNC: 213 MG/DL (ref 70–99)
GLUCOSE BLD-MCNC: 216 MG/DL (ref 70–99)
GLUCOSE BLD-MCNC: 326 MG/DL (ref 70–99)
GLUCOSE BLD-MCNC: 379 MG/DL (ref 70–99)
GLUCOSE SERPL-MCNC: 234 MG/DL (ref 70–99)
HCT VFR BLD AUTO: 36 % (ref 36–48)
HGB BLD-MCNC: 12.1 G/DL (ref 12–16)
LYMPHOCYTES # BLD: 1.4 K/UL (ref 1–5.1)
LYMPHOCYTES NFR BLD: 14.4 %
MCH RBC QN AUTO: 30.5 PG (ref 26–34)
MCHC RBC AUTO-ENTMCNC: 33.6 G/DL (ref 31–36)
MCV RBC AUTO: 90.7 FL (ref 80–100)
MONOCYTES # BLD: 0.9 K/UL (ref 0–1.3)
MONOCYTES NFR BLD: 9.2 %
NEUTROPHILS # BLD: 7.6 K/UL (ref 1.7–7.7)
NEUTROPHILS NFR BLD: 75.2 %
PERFORMED ON: ABNORMAL
PLATELET # BLD AUTO: 297 K/UL (ref 135–450)
PMV BLD AUTO: 7.1 FL (ref 5–10.5)
POTASSIUM SERPL-SCNC: 4 MMOL/L (ref 3.5–5.1)
RBC # BLD AUTO: 3.97 M/UL (ref 4–5.2)
SODIUM SERPL-SCNC: 136 MMOL/L (ref 136–145)
WBC # BLD AUTO: 10 K/UL (ref 4–11)

## 2023-12-07 PROCEDURE — 1200000000 HC SEMI PRIVATE

## 2023-12-07 PROCEDURE — 2580000003 HC RX 258: Performed by: INTERNAL MEDICINE

## 2023-12-07 PROCEDURE — 97535 SELF CARE MNGMENT TRAINING: CPT

## 2023-12-07 PROCEDURE — 6370000000 HC RX 637 (ALT 250 FOR IP): Performed by: INTERNAL MEDICINE

## 2023-12-07 PROCEDURE — 85025 COMPLETE CBC W/AUTO DIFF WBC: CPT

## 2023-12-07 PROCEDURE — 94640 AIRWAY INHALATION TREATMENT: CPT

## 2023-12-07 PROCEDURE — 6360000002 HC RX W HCPCS: Performed by: INTERNAL MEDICINE

## 2023-12-07 PROCEDURE — 2700000000 HC OXYGEN THERAPY PER DAY

## 2023-12-07 PROCEDURE — 80048 BASIC METABOLIC PNL TOTAL CA: CPT

## 2023-12-07 PROCEDURE — 94761 N-INVAS EAR/PLS OXIMETRY MLT: CPT

## 2023-12-07 PROCEDURE — 94660 CPAP INITIATION&MGMT: CPT

## 2023-12-07 PROCEDURE — 6360000002 HC RX W HCPCS: Performed by: NURSE PRACTITIONER

## 2023-12-07 PROCEDURE — 94669 MECHANICAL CHEST WALL OSCILL: CPT

## 2023-12-07 PROCEDURE — 99223 1ST HOSP IP/OBS HIGH 75: CPT | Performed by: INTERNAL MEDICINE

## 2023-12-07 RX ORDER — INSULIN LISPRO 100 [IU]/ML
0-16 INJECTION, SOLUTION INTRAVENOUS; SUBCUTANEOUS
Status: DISCONTINUED | OUTPATIENT
Start: 2023-12-07 | End: 2023-12-14 | Stop reason: HOSPADM

## 2023-12-07 RX ORDER — INSULIN LISPRO 100 [IU]/ML
0-4 INJECTION, SOLUTION INTRAVENOUS; SUBCUTANEOUS NIGHTLY
Status: DISCONTINUED | OUTPATIENT
Start: 2023-12-07 | End: 2023-12-14 | Stop reason: HOSPADM

## 2023-12-07 RX ORDER — ALBUTEROL SULFATE 90 UG/1
2 AEROSOL, METERED RESPIRATORY (INHALATION)
Status: DISCONTINUED | OUTPATIENT
Start: 2023-12-07 | End: 2023-12-07

## 2023-12-07 RX ORDER — PREDNISONE 20 MG/1
40 TABLET ORAL DAILY
Status: DISCONTINUED | OUTPATIENT
Start: 2023-12-07 | End: 2023-12-11

## 2023-12-07 RX ORDER — IPRATROPIUM BROMIDE AND ALBUTEROL SULFATE 2.5; .5 MG/3ML; MG/3ML
1 SOLUTION RESPIRATORY (INHALATION)
Status: DISCONTINUED | OUTPATIENT
Start: 2023-12-07 | End: 2023-12-07

## 2023-12-07 RX ORDER — ALBUTEROL SULFATE 90 UG/1
2 AEROSOL, METERED RESPIRATORY (INHALATION)
Status: DISCONTINUED | OUTPATIENT
Start: 2023-12-08 | End: 2023-12-08

## 2023-12-07 RX ORDER — FUROSEMIDE 40 MG/1
40 TABLET ORAL 2 TIMES DAILY
Status: DISCONTINUED | OUTPATIENT
Start: 2023-12-07 | End: 2023-12-12

## 2023-12-07 RX ADMIN — Medication 2 PUFF: at 20:24

## 2023-12-07 RX ADMIN — SODIUM CHLORIDE, PRESERVATIVE FREE 10 ML: 5 INJECTION INTRAVENOUS at 12:02

## 2023-12-07 RX ADMIN — PREDNISONE 40 MG: 20 TABLET ORAL at 11:43

## 2023-12-07 RX ADMIN — Medication 2 PUFF: at 16:07

## 2023-12-07 RX ADMIN — HYDROCODONE BITARTRATE AND HOMATROPINE METHYLBROMIDE 5 ML: 5; 1.5 SOLUTION ORAL at 05:12

## 2023-12-07 RX ADMIN — AZITHROMYCIN MONOHYDRATE 500 MG: 500 INJECTION, POWDER, LYOPHILIZED, FOR SOLUTION INTRAVENOUS at 13:02

## 2023-12-07 RX ADMIN — Medication 2 PUFF: at 23:32

## 2023-12-07 RX ADMIN — SODIUM CHLORIDE, PRESERVATIVE FREE 10 ML: 5 INJECTION INTRAVENOUS at 22:17

## 2023-12-07 RX ADMIN — FUROSEMIDE 40 MG: 40 TABLET ORAL at 17:33

## 2023-12-07 RX ADMIN — INSULIN LISPRO 2 UNITS: 100 INJECTION, SOLUTION INTRAVENOUS; SUBCUTANEOUS at 11:43

## 2023-12-07 RX ADMIN — SODIUM CHLORIDE: 9 INJECTION, SOLUTION INTRAVENOUS at 11:52

## 2023-12-07 RX ADMIN — FUROSEMIDE 40 MG: 40 TABLET ORAL at 11:43

## 2023-12-07 RX ADMIN — Medication 2 PUFF: at 20:23

## 2023-12-07 RX ADMIN — Medication 1 TABLET: at 08:53

## 2023-12-07 RX ADMIN — CEFTRIAXONE SODIUM 1000 MG: 1 INJECTION, POWDER, FOR SOLUTION INTRAMUSCULAR; INTRAVENOUS at 12:01

## 2023-12-07 RX ADMIN — INSULIN LISPRO 4 UNITS: 100 INJECTION, SOLUTION INTRAVENOUS; SUBCUTANEOUS at 21:58

## 2023-12-07 RX ADMIN — GUANFACINE 2 MG: 1 TABLET ORAL at 22:15

## 2023-12-07 RX ADMIN — CYANOCOBALAMIN TAB 500 MCG 500 MCG: 500 TAB at 08:54

## 2023-12-07 RX ADMIN — INSULIN LISPRO 2 UNITS: 100 INJECTION, SOLUTION INTRAVENOUS; SUBCUTANEOUS at 09:00

## 2023-12-07 RX ADMIN — DEXAMETHASONE 1 MG: 1 TABLET ORAL at 08:54

## 2023-12-07 RX ADMIN — INSULIN LISPRO 8 UNITS: 100 INJECTION, SOLUTION INTRAVENOUS; SUBCUTANEOUS at 17:29

## 2023-12-07 RX ADMIN — SERTRALINE 100 MG: 50 TABLET, FILM COATED ORAL at 21:58

## 2023-12-07 RX ADMIN — HYDROCODONE BITARTRATE AND HOMATROPINE METHYLBROMIDE 5 ML: 5; 1.5 SOLUTION ORAL at 18:19

## 2023-12-07 RX ADMIN — GUANFACINE 2 MG: 1 TABLET ORAL at 08:54

## 2023-12-07 RX ADMIN — ENOXAPARIN SODIUM 40 MG: 100 INJECTION SUBCUTANEOUS at 08:54

## 2023-12-07 RX ADMIN — ALBUTEROL SULFATE 2 PUFF: 90 AEROSOL, METERED RESPIRATORY (INHALATION) at 08:02

## 2023-12-07 RX ADMIN — ENOXAPARIN SODIUM 40 MG: 100 INJECTION SUBCUTANEOUS at 21:58

## 2023-12-07 NOTE — PLAN OF CARE
Problem: Pain  Goal: Verbalizes/displays adequate comfort level or baseline comfort level  12/7/2023 0819 by Ana Chatman RN  Outcome: Progressing  Flowsheets (Taken 12/6/2023 0735)  Verbalizes/displays adequate comfort level or baseline comfort level:   Encourage patient to monitor pain and request assistance   Assess pain using appropriate pain scale   Administer analgesics based on type and severity of pain and evaluate response   Implement non-pharmacological measures as appropriate and evaluate response   Consider cultural and social influences on pain and pain management   Notify Licensed Independent Practitioner if interventions unsuccessful or patient reports new pain  12/7/2023 0420 by Wiley Kat RN  Outcome: Progressing     Problem: Safety - Adult  Goal: Free from fall injury  12/7/2023 0819 by Ana Chatman RN  Outcome: Progressing  Flowsheets (Taken 12/7/2023 0819)  Free From Fall Injury: Instruct family/caregiver on patient safety  12/7/2023 0420 by Wiley Kat RN  Outcome: Progressing     Problem: ABCDS Injury Assessment  Goal: Absence of physical injury  12/7/2023 0819 by Ana Chatman RN  Outcome: Progressing  Flowsheets (Taken 12/7/2023 0819)  Absence of Physical Injury: Implement safety measures based on patient assessment  12/7/2023 0420 by Wiley Kat RN  Outcome: Progressing

## 2023-12-07 NOTE — CONSULTS
Consult Call Back    Levar Davis MD   Date:12/7/2023,  Time:10:13 AM    Electronically signed by Arely Little on 12/7/23 at 10:13 AM EST

## 2023-12-07 NOTE — CONSULTS
PULMONARY AND CRITICAL CARE INPATIENT NOTE        Sophie Weeks   : 1965  MRN: [id number]     Admitting Physician: Yanni Marquis MD  Attending Physician: Yanni Marquis MD  PCP: Jerson Mcgarry MD    Admission: 2023   Date of Service: 2023    Chief Complaint   Patient presents with    Shortness of Breath     Tachypnea and labored on arrival with EMS breathing in progress. 1 duoneb via EMS. Hx edema and HTN    Chest Pain     Sternal chest pain. States her ribs also hurt. Cough           ASSESSMENT & PLAN       60 y.o. pleasant  female patient with:    Hospital Problems             Last Modified POA    * (Principal) Sepsis ([address]) 2023 Yes          # Acute hypoxic respiratory failure  # Community-acquired pneumonia. Right lower lobe masslike consolidation  # Bronchial asthma on albuterol only. Now with exacerbation and extensive wheezing  # Metabolic syndrome: Hypertension, dyslipidemia, diabetes, body habitus with BMI 56  # Obstructive sleep apnea on CPAP of 18  # Anxiety on therapy  Continue empiric antibiotics to finish 7 days  Follow-up on noninvasive micro work-up/sputum culture  Dexamethasone switched to prednisone orally in the morning  Keep blood sugar 140-180 mg/dL with insulin adjustment  Dulera 200 mcg with DuoNebs scheduled  Home Lasix 80 mg daily resumed  Follow-up electrolytes and replace as needed  Echocardiogram ordered  Bronchial hygiene measures  Aspiration precautions  Continue home CPAP or hospital BiPAP at bedtime  Continue to wean oxygen with target 90 to 94%. DVT ppx measures.   Patient will need follow-up CT scan in 6 to 8 weeks to confirm resolution or improvement of the masslike consolidation in the right lower lobe      LOS: Hospital Day: 4    Code:Full Code          Subjective/Objective           CC/Reason for Consult: Respiratory failure, pneumonia        HPI:  213  54-year-old lady with multiple comorbidities including type 2 diabetes,

## 2023-12-07 NOTE — PROGRESS NOTES
12/06/23 2326   NIV Type   NIV Started/Stopped On   Equipment Type v60   Mode Bilevel   Mask Type Full face mask   Mask Size Small   Assessment   SpO2 99 %   Settings/Measurements   PIP Observed 12 cm H20  (ramp applied for comfort)   IPAP 15 cmH20   CPAP/EPAP 5 cmH2O   Vt (Measured) 461 mL   Rate Ordered 18   Insp Rise Time (%) 3 %   FiO2  40 %   I Time/ I Time % 1 s   Minute Volume (L/min) 15.4 Liters   Mask Leak (lpm) 25 lpm   Patient's Home Machine No   Alarm Settings   Alarms On Y

## 2023-12-07 NOTE — PROGRESS NOTES
12/07/23 0345   NIV Type   NIV Started/Stopped On   Equipment Type v60   Mode Bilevel   Mask Type Full face mask   Mask Size Small   Assessment   Respirations 20   Comfort Level Good   Using Accessory Muscles No   Mask Compliance Good   Settings/Measurements   PIP Observed 17 cm H20   IPAP 15 cmH20   CPAP/EPAP 5 cmH2O   Vt (Measured) 817 mL   Rate Ordered 18   Insp Rise Time (%) 3 %   FiO2  35 %   I Time/ I Time % 1 s   Minute Volume (L/min) 16.1 Liters   Mask Leak (lpm) 55 lpm   Patient's Home Machine No   Alarm Settings   Alarms On Y

## 2023-12-07 NOTE — PROGRESS NOTES
Pt . Administered 8 units insulin per MAR. Secure message sent to Dr. Kimi Smith. Currently awaiting response.

## 2023-12-07 NOTE — PLAN OF CARE
Denies pain. Ambulates in room independently with steady gait.   Problem: Pain  Goal: Verbalizes/displays adequate comfort level or baseline comfort level  Outcome: Progressing     Problem: Safety - Adult  Goal: Free from fall injury  Outcome: Progressing     Problem: ABCDS Injury Assessment  Goal: Absence of physical injury  Outcome: Progressing

## 2023-12-07 NOTE — PROGRESS NOTES
Occupational Therapy  Facility/Department: [facility] C3 TELE/MED SURG/ONC  Daily Treatment Note  NAME: Adriano Agustin  : 1965  MRN: [id number]    Date of Service: 2023    Discharge Recommendations:  Home with assist PRN, Home with Home health OT  OT Equipment Recommendations  Equipment Needed: No    If pt is unable to be seen after this session, please let this note serve as discharge summary. Please see case management note for discharge disposition. Thank you. Patient Diagnosis(es): The primary encounter diagnosis was Acute respiratory failure with hypoxia ([address]). Diagnoses of Acute respiratory distress, Lactic acidosis, Hypoxia, COPD exacerbation (HCC), Pneumonia of right lower lobe due to infectious organism, and Severe sepsis [facility]) were also pertinent to this visit. Assessment    Assessment: Pt tolerated OT session fair this date. Pt declined functional transfers/mobility d/t difficulty breathing (SpO2 93%). Pt was agreeable to ADLs seated in chair. She required set-up for grooming and totalA for LB bathing. Pt was educated on the importance of OOB activity while in hospital, AE for LB ADLs, and pursed lip breathing. She is functioning below her baseline secondary to decreased strength, endurance, and IND with ADL tasks. Home with PRN assist and HHOT is recommended at d/c. Activity Tolerance: Patient tolerated treatment well;Patient limited by endurance  Discharge Recommendations: Home with assist PRN;Home with Home health OT  Equipment Needed: No      Plan   Occupational Therapy Plan  Times Per Week: 2-4x/week  Current Treatment Recommendations: Balance training;Functional mobility training; Endurance training; Safety education & training;Self-Care / ADL     Restrictions  Restrictions/Precautions  Restrictions/Precautions: General Precautions  Required Braces or Orthoses?: No  Position Activity Restriction  Other position/activity restrictions: 3L O2, IV lines    Subjective Subjective  Subjective: Pt resting in recliner upon OT arrival, agreeable to treatment with encouragement. Pt reports concern with breathing, requesting breathing treatment (RN notified). Pain: denies pain at rest  Orientation  Overall Orientation Status: Within Functional Limits  Orientation Level: Oriented X4  Cognition  Overall Cognitive Status: WFL        Objective    Vitals  SpO2: 93%  BP: 141/64  HR: 85       Bed Mobility Training  Bed Mobility Training: No (in chair at start and end of session)  Transfer Training  Transfer Training: No (pt refused transfers/mobility this date d/t fatigue and difficulty breathing, agreeable to seated ADL tasks)       ADL  Feeding: Independent  Grooming: Setup  Grooming Skilled Clinical Factors: combing hair  LE Bathing: Dependent/Total  LE Bathing Skilled Clinical Factors: totalA to wash BLE with soap and water and to dry BLE. LE Dressing: Dependent/Total  LE Dressing Skilled Clinical Factors: doff/don socks  Functional Mobility: Other (Comment)  Functional Mobility Skilled Clinical Factors: pt refused mobility  Skin Care: Soap and water          Safety Devices  Type of Devices: All fall risk precautions in place;Call light within reach; Left in chair;Nurse notified  Restraints  Restraints Initially in Place: No     Patient Education  Education Given To: Patient  Education Provided: Role of Therapy;Transfer Training;Plan of Care;ADL Adaptive Strategies; Energy Conservation;Equipment  Education Provided Comments: role of OT, importance of OOB activity, AE for LB dressing/bathing  Education Method: Verbal  Barriers to Learning: None  Education Outcome: Verbalized understanding;Continued education needed  Disease Specific Education: Pt educated on importance of OOB mobility, prevention of complications of bedrest, and general safety during hospitalization. Pt verbalized understanding but will benefit from continued reinforcement.     Goals  Short Term Goals  Time Frame for Short

## 2023-12-08 PROBLEM — E11.65 UNCONTROLLED TYPE 2 DIABETES MELLITUS WITH HYPERGLYCEMIA (HCC): Status: ACTIVE | Noted: 2023-12-08

## 2023-12-08 PROBLEM — R91.8 PULMONARY INFILTRATE: Status: ACTIVE | Noted: 2023-12-08

## 2023-12-08 PROBLEM — E66.01 MORBID OBESITY WITH BMI OF 50.0-59.9, ADULT (HCC): Status: ACTIVE | Noted: 2023-12-08

## 2023-12-08 PROBLEM — R06.02 SHORTNESS OF BREATH: Status: ACTIVE | Noted: 2023-12-08

## 2023-12-08 PROBLEM — G47.33 OSA (OBSTRUCTIVE SLEEP APNEA): Status: ACTIVE | Noted: 2023-12-08

## 2023-12-08 PROBLEM — K76.0 HEPATIC STEATOSIS: Status: ACTIVE | Noted: 2023-12-08

## 2023-12-08 LAB
BACTERIA BLD CULT ORG #2: NORMAL
BACTERIA BLD CULT: NORMAL
GLUCOSE BLD-MCNC: 207 MG/DL (ref 70–99)
GLUCOSE BLD-MCNC: 210 MG/DL (ref 70–99)
GLUCOSE BLD-MCNC: 222 MG/DL (ref 70–99)
GLUCOSE BLD-MCNC: 247 MG/DL (ref 70–99)
GLUCOSE BLD-MCNC: 304 MG/DL (ref 70–99)
PERFORMED ON: ABNORMAL

## 2023-12-08 PROCEDURE — 94669 MECHANICAL CHEST WALL OSCILL: CPT

## 2023-12-08 PROCEDURE — 6370000000 HC RX 637 (ALT 250 FOR IP): Performed by: INTERNAL MEDICINE

## 2023-12-08 PROCEDURE — 94640 AIRWAY INHALATION TREATMENT: CPT

## 2023-12-08 PROCEDURE — 2700000000 HC OXYGEN THERAPY PER DAY

## 2023-12-08 PROCEDURE — 94761 N-INVAS EAR/PLS OXIMETRY MLT: CPT

## 2023-12-08 PROCEDURE — 94660 CPAP INITIATION&MGMT: CPT

## 2023-12-08 PROCEDURE — 6360000002 HC RX W HCPCS: Performed by: INTERNAL MEDICINE

## 2023-12-08 PROCEDURE — 1200000000 HC SEMI PRIVATE

## 2023-12-08 PROCEDURE — 2580000003 HC RX 258: Performed by: INTERNAL MEDICINE

## 2023-12-08 PROCEDURE — 99233 SBSQ HOSP IP/OBS HIGH 50: CPT | Performed by: INTERNAL MEDICINE

## 2023-12-08 RX ORDER — ALBUTEROL SULFATE 90 UG/1
2 AEROSOL, METERED RESPIRATORY (INHALATION) EVERY 4 HOURS PRN
Status: DISCONTINUED | OUTPATIENT
Start: 2023-12-08 | End: 2023-12-14 | Stop reason: HOSPADM

## 2023-12-08 RX ORDER — INSULIN GLARGINE 100 [IU]/ML
13 INJECTION, SOLUTION SUBCUTANEOUS NIGHTLY
Status: DISCONTINUED | OUTPATIENT
Start: 2023-12-08 | End: 2023-12-09

## 2023-12-08 RX ADMIN — HYDROCODONE BITARTRATE AND HOMATROPINE METHYLBROMIDE 5 ML: 5; 1.5 SOLUTION ORAL at 22:29

## 2023-12-08 RX ADMIN — PREDNISONE 40 MG: 20 TABLET ORAL at 09:05

## 2023-12-08 RX ADMIN — AZITHROMYCIN MONOHYDRATE 500 MG: 500 INJECTION, POWDER, LYOPHILIZED, FOR SOLUTION INTRAVENOUS at 13:21

## 2023-12-08 RX ADMIN — Medication 2 PUFF: at 12:06

## 2023-12-08 RX ADMIN — INSULIN LISPRO 4 UNITS: 100 INJECTION, SOLUTION INTRAVENOUS; SUBCUTANEOUS at 12:02

## 2023-12-08 RX ADMIN — Medication 2 PUFF: at 20:16

## 2023-12-08 RX ADMIN — INSULIN GLARGINE 13 UNITS: 100 INJECTION, SOLUTION SUBCUTANEOUS at 22:28

## 2023-12-08 RX ADMIN — Medication 1 TABLET: at 09:05

## 2023-12-08 RX ADMIN — CYANOCOBALAMIN TAB 500 MCG 500 MCG: 500 TAB at 09:05

## 2023-12-08 RX ADMIN — SERTRALINE 100 MG: 50 TABLET, FILM COATED ORAL at 22:29

## 2023-12-08 RX ADMIN — FUROSEMIDE 40 MG: 40 TABLET ORAL at 09:04

## 2023-12-08 RX ADMIN — INSULIN LISPRO 12 UNITS: 100 INJECTION, SOLUTION INTRAVENOUS; SUBCUTANEOUS at 16:35

## 2023-12-08 RX ADMIN — SODIUM CHLORIDE, PRESERVATIVE FREE 10 ML: 5 INJECTION INTRAVENOUS at 22:29

## 2023-12-08 RX ADMIN — GUANFACINE 2 MG: 1 TABLET ORAL at 22:29

## 2023-12-08 RX ADMIN — Medication 2 PUFF: at 15:41

## 2023-12-08 RX ADMIN — ENOXAPARIN SODIUM 40 MG: 100 INJECTION SUBCUTANEOUS at 22:28

## 2023-12-08 RX ADMIN — ENOXAPARIN SODIUM 40 MG: 100 INJECTION SUBCUTANEOUS at 09:04

## 2023-12-08 RX ADMIN — GUANFACINE 2 MG: 1 TABLET ORAL at 09:05

## 2023-12-08 RX ADMIN — Medication 2 PUFF: at 07:47

## 2023-12-08 RX ADMIN — CEFTRIAXONE SODIUM 1000 MG: 1 INJECTION, POWDER, FOR SOLUTION INTRAMUSCULAR; INTRAVENOUS at 12:03

## 2023-12-08 RX ADMIN — INSULIN LISPRO 4 UNITS: 100 INJECTION, SOLUTION INTRAVENOUS; SUBCUTANEOUS at 09:15

## 2023-12-08 RX ADMIN — FUROSEMIDE 40 MG: 40 TABLET ORAL at 17:09

## 2023-12-08 ASSESSMENT — PAIN - FUNCTIONAL ASSESSMENT: PAIN_FUNCTIONAL_ASSESSMENT: PREVENTS OR INTERFERES SOME ACTIVE ACTIVITIES AND ADLS

## 2023-12-08 ASSESSMENT — PAIN DESCRIPTION - DESCRIPTORS: DESCRIPTORS: ACHING

## 2023-12-08 ASSESSMENT — PAIN DESCRIPTION - LOCATION: LOCATION: RIB CAGE

## 2023-12-08 ASSESSMENT — PAIN DESCRIPTION - ORIENTATION: ORIENTATION: LEFT;RIGHT

## 2023-12-08 ASSESSMENT — PAIN SCALES - GENERAL: PAINLEVEL_OUTOF10: 8

## 2023-12-08 NOTE — PROGRESS NOTES
smoker, asthma FINDINGS: Pulmonary Arteries: Pulmonary arteries are adequately opacified for evaluation. No evidence of intraluminal filling defect to suggest pulmonary embolism. Main pulmonary artery is normal in caliber. Mediastinum: No evidence of mediastinal lymphadenopathy. The heart and pericardium demonstrate no acute abnormality. There is no acute abnormality of the thoracic aorta. Lungs/pleura: There is patchy consolidation within the anterior aspect of the right lower lobe. The lungs are otherwise clear. There is no pneumothorax or effusion. Upper Abdomen: The liver exhibits diffuse fatty infiltration but is otherwise unremarkable. Soft Tissues/Bones: No acute bone or soft tissue abnormality. 1. Negative for pulmonary embolus. 2. Right lower lobe pneumonia. Radiographic follow-up to resolution recommended. XR CHEST PORTABLE    Result Date: 12/4/2023  EXAMINATION: ONE XRAY VIEW OF THE CHEST 12/4/2023 10:28 am COMPARISON: 07/28/2022 HISTORY: ORDERING SYSTEM PROVIDED HISTORY: SOB TECHNOLOGIST PROVIDED HISTORY: Reason for exam:->SOB Reason for Exam: sob FINDINGS: There is a approximately 2.5 cm nodular density in the the right mid lung zone. This could represent a tumor. The lungs otherwise are clear. The heart and mediastinal structures unremarkable. Bony thorax appears normal.     Approximately 2.5 cm nodular density noted in the right mid lung zone. This could represent a lung tumor and further evaluation with CT is recommended.         Latest Reference Range & Units 12/04/23 10:08   pH, Hermann 7.350 - 7.450  7.429   pCO2, Hermann 40.0 - 50.0 mmHg 42.2   pO2, Hermann 25.0 - 40.0 mmHg 40.6 (H)   HCO3, Venous 23.0 - 29.0 mmol/L 27.3   TC02 (Calc), Hermann Not Established mmol/L 29   Base Excess, Hermann -3.0 - 3.0 mmol/L 2.7   MetHgb, Hermann <1.5 % 0.5   O2 Sat, Hermann Not Established % 77      Latest Reference Range & Units 12/04/23 10:11   INFLUENZA A NOT DETECTED  NOT DETECTED   INFLUENZA B NOT DETECTED  NOT

## 2023-12-08 NOTE — PROGRESS NOTES
Hospital Medicine Progress Note      Date of Admission: 12/4/2023  Hospital Day: 5      Chief Admission Complaint:  sob     Subjective:  still notes weakness/sob/maliase, down to 1.5L(from 4L), appears that cough/wheezing improved after dulera/lasix/prednisone      Presenting Admission History:          60 y.o. female  with hx of dm2, obesity, MEHUL(on cpap) who presented to [facility] with sob and fatigue. Pt noted symptoms beginning last thurs with dry cough that became productive. She had assoc f/chills with temp of 101 at home. She denied myalgias/arthralgias. She also noted orthopnea(smothering feeling with laying flat) and PITTMAN for the past 1 week. No chest pain/pressure but rib pain due to coughing. She tried Ezzie Leap and old abx(took on Saturday) until seeing  PCP today. In the office, PCP noted o2 in the 80s, bp was 200s/100s, ht rate in the 100s, therefore called EMS.   -had assoc fatigue  -No n/v  -she notes chronic diarrhea  -No appetite  -pt feels similar to prior pna  -she tried home inhaler w/o relief  -she uses cpap( Pressure of 18, used since 1992)  -notes weeping on legs sometimes  -she has chronic Back pain  -she is Not on oxygen at home  -she does not see a pulmonologist        Assessment/Plan:       Current Principal Problem:  Sepsis ([address])       Sepsis- with tachycardia/tachypnea, fever of 101 at home, +pulm source  -Bcx ngtd  -Iv abx started 12/4  --given Ivfs  -Trended lactate and normalized  -Ua was ordered in ER, no infection noted     Sob/hypoxia- transient, unclear etiology, CXR noted possible mass  -CT chest done, neg for PE but noted rt sided pna  -supp o2 if needed  -Duonebs scheduled  -Mucinex  -strep pna/legionella ordered and neg  -Continued tenex  -Tessalon perles   -pulm consulted 12/6 given lack of signif improvement, apprec recs, noted bronchial asthma  -decadron po started 12/5 pm, switched to prednisone by pulm   -echo ordered   -started dulera  - lasix

## 2023-12-08 NOTE — PROGRESS NOTES
12/08/23 0432   NIV Type   NIV Started/Stopped On   Equipment Type v60   Mode Bilevel   Mask Type Full face mask   Mask Size Small   Assessment   Respirations 18   SpO2 94 %   Comfort Level Good   Using Accessory Muscles No   Mask Compliance Good   Skin Assessment Clean, dry, & intact   Settings/Measurements   PIP Observed 16 cm H20   IPAP 15 cmH20   CPAP/EPAP 5 cmH2O   Vt (Measured) 411 mL   Rate Ordered 18   FiO2  35 %   I Time/ I Time % 1 s   Minute Volume (L/min) 11 Liters   Mask Leak (lpm) 0 lpm   Patient's Home Machine No   Alarm Settings   Alarms On Y   Low Pressure (cmH2O) 6 cmH2O   High Pressure (cmH2O) 30 cmH2O   RR Low (bpm) 6   RR High (bpm) 40 br/min

## 2023-12-08 NOTE — PROGRESS NOTES
Pt is A/O x 4, VS stable, Pt woke up and had me call respiratory to pause the CPAP machine. Pt was sweating. Temp 96.0. Oxygen 93% w/o O2. Will continue to monitor O2. Pt is not in pain. No reports of nausea. Pt is in chair. Bedside table and call light within reach.

## 2023-12-08 NOTE — PLAN OF CARE
Problem: Safety - Adult  Goal: Free from fall injury  Outcome: Progressing  Flowsheets (Taken 12/8/2023 0207)  Free From Fall Injury:   Instruct family/caregiver on patient safety   Based on caregiver fall risk screen, instruct family/caregiver to ask for assistance with transferring infant if caregiver noted to have fall risk factors     Problem: ABCDS Injury Assessment  Goal: Absence of physical injury  Outcome: Progressing

## 2023-12-08 NOTE — PROGRESS NOTES
12/07/23 2339   NIV Type   NIV Started/Stopped On   Equipment Type v60   Mode Bilevel   Mask Type Full face mask   Mask Size Small   Assessment   Respirations 24   SpO2 95 %   Comfort Level Good   Using Accessory Muscles No   Mask Compliance Good   Skin Assessment Clean, dry, & intact   Skin Protection for O2 Device   (pt is refused cushion)   Settings/Measurements   PIP Observed 16 cm H20   IPAP 15 cmH20   CPAP/EPAP 5 cmH2O   Vt (Measured) 734 mL   Rate Ordered 18   FiO2  35 %   I Time/ I Time % 1 s   Minute Volume (L/min) 17.4 Liters   Mask Leak (lpm) 1 lpm   Patient's Home Machine No   Alarm Settings   Alarms On Y   Low Pressure (cmH2O) 30 cmH2O   High Pressure (cmH2O) 6 cmH2O   RR Low (bpm) 6   RR High (bpm) 40 br/min

## 2023-12-08 NOTE — CARE COORDINATION
Writer met with pt, offered home care referral as PT on 12/5 recommended home with home therapy. Pt not sure what insurance would pay for and cannot afford any copays at this time, writer explained can make referral and have home care run benefits and call before coming, pt declined at this point. Pt agreed to ask for CM over weekend if changes mind about wanting home care. Pt now on 1L/NC, trying to wean. Pt will need Lyft home at discharge.   HAILE Freeman-REE

## 2023-12-08 NOTE — PROGRESS NOTES
Pt A/O x 4, VSS. Pt reported increased rest last night. Pt ambulating to restroom with no difficulty, and tolerating diet with no reports of nausea. Pt complaining of pain in throat and abdomen when coughing. Administered medication per MAR. Bed locked and in low position. Bedside table and call light within reach. Pt voices no other concerns at this time.

## 2023-12-08 NOTE — PROGRESS NOTES
Lungs expiratory wheezes. Pt a/o. Vss. Encourage ble elevation since +1 pitting edema. Skin on BLE dry and flaky. Pt states she feels much better than previous days. Cough is infrequent now and sob is better unless ambulating then about the same. Pt removed hat in toilet this shift and RN educated on importance of I&O since she is getting lasix. She states the MD told her she does not need to measure as long as she is going. Told pt she has an order for strict I&O which means we measure urine but she said she wanted to ask MD. Pt stable and denied needs when writer left room.

## 2023-12-09 LAB
GLUCOSE BLD-MCNC: 152 MG/DL (ref 70–99)
GLUCOSE BLD-MCNC: 214 MG/DL (ref 70–99)
GLUCOSE BLD-MCNC: 271 MG/DL (ref 70–99)
GLUCOSE BLD-MCNC: 298 MG/DL (ref 70–99)
PERFORMED ON: ABNORMAL

## 2023-12-09 PROCEDURE — 6370000000 HC RX 637 (ALT 250 FOR IP): Performed by: INTERNAL MEDICINE

## 2023-12-09 PROCEDURE — 94660 CPAP INITIATION&MGMT: CPT

## 2023-12-09 PROCEDURE — 99232 SBSQ HOSP IP/OBS MODERATE 35: CPT | Performed by: INTERNAL MEDICINE

## 2023-12-09 PROCEDURE — C8929 TTE W OR WO FOL WCON,DOPPLER: HCPCS

## 2023-12-09 PROCEDURE — 94669 MECHANICAL CHEST WALL OSCILL: CPT

## 2023-12-09 PROCEDURE — 2700000000 HC OXYGEN THERAPY PER DAY

## 2023-12-09 PROCEDURE — 94761 N-INVAS EAR/PLS OXIMETRY MLT: CPT

## 2023-12-09 PROCEDURE — 6360000002 HC RX W HCPCS: Performed by: INTERNAL MEDICINE

## 2023-12-09 PROCEDURE — 1200000000 HC SEMI PRIVATE

## 2023-12-09 PROCEDURE — 2580000003 HC RX 258: Performed by: INTERNAL MEDICINE

## 2023-12-09 PROCEDURE — 94640 AIRWAY INHALATION TREATMENT: CPT

## 2023-12-09 RX ORDER — INSULIN GLARGINE 100 [IU]/ML
18 INJECTION, SOLUTION SUBCUTANEOUS NIGHTLY
Status: DISCONTINUED | OUTPATIENT
Start: 2023-12-09 | End: 2023-12-10

## 2023-12-09 RX ADMIN — ACETAMINOPHEN 650 MG: 325 TABLET, FILM COATED ORAL at 22:50

## 2023-12-09 RX ADMIN — CEFTRIAXONE SODIUM 1000 MG: 1 INJECTION, POWDER, FOR SOLUTION INTRAMUSCULAR; INTRAVENOUS at 10:44

## 2023-12-09 RX ADMIN — CYANOCOBALAMIN TAB 500 MCG 500 MCG: 500 TAB at 10:29

## 2023-12-09 RX ADMIN — INSULIN LISPRO 8 UNITS: 100 INJECTION, SOLUTION INTRAVENOUS; SUBCUTANEOUS at 17:24

## 2023-12-09 RX ADMIN — HYDROCODONE BITARTRATE AND HOMATROPINE METHYLBROMIDE 5 ML: 5; 1.5 SOLUTION ORAL at 22:50

## 2023-12-09 RX ADMIN — INSULIN LISPRO 8 UNITS: 100 INJECTION, SOLUTION INTRAVENOUS; SUBCUTANEOUS at 12:51

## 2023-12-09 RX ADMIN — PREDNISONE 40 MG: 20 TABLET ORAL at 10:29

## 2023-12-09 RX ADMIN — Medication 2 PUFF: at 08:05

## 2023-12-09 RX ADMIN — FUROSEMIDE 40 MG: 40 TABLET ORAL at 17:21

## 2023-12-09 RX ADMIN — SERTRALINE 100 MG: 50 TABLET, FILM COATED ORAL at 22:46

## 2023-12-09 RX ADMIN — HYDROCODONE BITARTRATE AND HOMATROPINE METHYLBROMIDE 5 ML: 5; 1.5 SOLUTION ORAL at 17:28

## 2023-12-09 RX ADMIN — Medication 1 TABLET: at 10:29

## 2023-12-09 RX ADMIN — Medication 2 PUFF: at 00:45

## 2023-12-09 RX ADMIN — SODIUM CHLORIDE, PRESERVATIVE FREE 10 ML: 5 INJECTION INTRAVENOUS at 10:44

## 2023-12-09 RX ADMIN — ENOXAPARIN SODIUM 40 MG: 100 INJECTION SUBCUTANEOUS at 10:33

## 2023-12-09 RX ADMIN — Medication 2 PUFF: at 19:28

## 2023-12-09 RX ADMIN — GUANFACINE 2 MG: 1 TABLET ORAL at 22:46

## 2023-12-09 RX ADMIN — ENOXAPARIN SODIUM 40 MG: 100 INJECTION SUBCUTANEOUS at 22:45

## 2023-12-09 RX ADMIN — Medication 2 PUFF: at 19:23

## 2023-12-09 RX ADMIN — FUROSEMIDE 40 MG: 40 TABLET ORAL at 10:29

## 2023-12-09 RX ADMIN — GUANFACINE 2 MG: 1 TABLET ORAL at 10:31

## 2023-12-09 RX ADMIN — INSULIN GLARGINE 18 UNITS: 100 INJECTION, SOLUTION SUBCUTANEOUS at 22:46

## 2023-12-09 NOTE — PROGRESS NOTES
Hospital Medicine Progress Note      Date of Admission: 12/4/2023  Hospital Day: 6      Chief Admission Complaint:  sob     Subjective:  still notes weakness/sob/malaise but improved, down to 1L(from 4L),  sitting in chair       Presenting Admission History:         60 y.o. female  with hx of dm2, obesity, MEHUL(on cpap) who presented to [facility] with sob and fatigue. Pt noted symptoms beginning last thurs with dry cough that became productive. She had assoc f/chills with temp of 101 at home. She denied myalgias/arthralgias. She also noted orthopnea(smothering feeling with laying flat) and PITTMAN for the past 1 week. No chest pain/pressure but rib pain due to coughing. She tried Prabha Miss and old abx(took on Saturday) until seeing  PCP today. In the office, PCP noted o2 in the 80s, bp was 200s/100s, ht rate in the 100s, therefore called EMS.   -had assoc fatigue  -No n/v  -she notes chronic diarrhea  -No appetite  -pt feels similar to prior pna  -she tried home inhaler w/o relief  -she uses cpap( Pressure of 18, used since 1992)  -notes weeping on legs sometimes  -she has chronic Back pain  -she is Not on oxygen at home  -she does not see a pulmonologist        Assessment/Plan:       Current Principal Problem:  Sepsis ([address])       Sepsis- with tachycardia/tachypnea, fever of 101 at home, +pulm source  -Bcx ngtd  -Iv abx started 12/4  --given Ivfs  -Trended lactate and normalized  -Ua was ordered in ER, no infection noted     Sob/hypoxia- transient, unclear etiology, CXR noted possible mass  -CT chest done, neg for PE but noted rt sided pna  -supp o2 if needed  -Duonebs scheduled  -Mucinex  -strep pna/legionella ordered and neg  -Continued tenex  -Tessalon perles   -pulm consulted 12/6 given lack of signif improvement, apprec recs, noted bronchial asthma  -decadron po started 12/5 pm, switched to prednisone by pulm   -echo ordered(ef 55%, cannot tell wm abn)   -started dulera  - lasix restarted     Lactic dextrose       Scheduled Medications    insulin glargine  18 Units SubCUTAneous Nightly    predniSONE  40 mg Oral Daily    furosemide  40 mg Oral BID    mometasone-formoterol  2 puff Inhalation BID RT    insulin lispro  0-16 Units SubCUTAneous TID WC    insulin lispro  0-4 Units SubCUTAneous Nightly    guanFACINE  2 mg Oral BID    sertraline  100 mg Oral QHS    vitamin B-12  500 mcg Oral Daily    therapeutic multivitamin-minerals  1 tablet Oral Daily    sodium chloride flush  5-40 mL IntraVENous 2 times per day    enoxaparin  40 mg SubCUTAneous BID    cefTRIAXone (ROCEPHIN) IV  1,000 mg IntraVENous Q24H     PRN Meds: albuterol sulfate HFA, mineral oil-hydrophilic petrolatum, benzocaine-menthol, ipratropium 0.5 mg-albuterol 2.5 mg, diatrizoate meglumine-sodium, HYDROcodone homatropine, traMADol, sodium chloride flush, sodium chloride, potassium chloride **OR** potassium alternative oral replacement **OR** potassium chloride, magnesium sulfate, ondansetron **OR** ondansetron, polyethylene glycol, acetaminophen **OR** acetaminophen, benzonatate, glucose, dextrose bolus **OR** dextrose bolus, glucagon (rDNA), dextrose     Labs:  Personally reviewed and interpreted for clinical significance. Recent Labs     12/07/23  0531   WBC 10.0   HGB 12.1   HCT 36.0        Recent Labs     12/07/23  0531      K 4.0   CL 97*   CO2 27   BUN 13   CREATININE <0.5*   CALCIUM 9.4     No results for input(s): \"PROBNP\", \"TROPHS\" in the last 72 hours. No results for input(s): \"LABA1C\" in the last 72 hours. No results for input(s): \"AST\", \"ALT\", \"BILIDIR\", \"BILITOT\", \"ALKPHOS\" in the last 72 hours. No results for input(s): \"INR\", \"LACTA\", \"TSH\" in the last 72 hours. Urine Cultures: No results found for: \"LABURIN\"  Blood Cultures:   Lab Results   Component Value Date/Time    BC No Growth after 4 days of incubation.  12/04/2023 10:08 AM     Lab Results   Component Value Date/Time    BLOODCULT2 No Growth after 4 days of

## 2023-12-09 NOTE — PROGRESS NOTES
12/09/23 0052   NIV Type   $NIV $Daily Charge   NIV Started/Stopped On   Equipment Type v60   Mode Bilevel   Mask Type Full face mask   Mask Size Small   Assessment   Pulse 68   SpO2 96 %   Breath Sounds   Breath Sounds Bilateral Diminished; Expiratory wheezing   Settings/Measurements   PIP Observed 11 cm H20   IPAP 15 cmH20   CPAP/EPAP 5 cmH2O   Vt (Measured) 728 mL   Rate Ordered 18   Insp Rise Time (%) 3 %   FiO2  35 %   I Time/ I Time % 1 s   Minute Volume (L/min) 15.9 Liters   Mask Leak (lpm) 0 lpm   Patient's Home Machine No   Alarm Settings   Alarms On Y   Low Pressure (cmH2O) 6 cmH2O   High Pressure (cmH2O) 30 cmH2O   Apnea (secs) 20 secs   RR Low (bpm) 6   RR High (bpm) 40 br/min   Oxygen Therapy/Pulse Ox   O2 Therapy Oxygen   O2 Device PAP (positive airway pressure)

## 2023-12-09 NOTE — PROGRESS NOTES
Pt is adamantly refusing strict output monitoring. She was educated on the importance of monitoring fluid balance- she verbalized her understanding and continued to refuse.

## 2023-12-09 NOTE — PROGRESS NOTES
12/09/23 0805   NIV Type   NIV Started/Stopped On   Equipment Type V60   Mode Bilevel   Mask Type Full face mask   Mask Size Small   Assessment   SpO2 100 %   Comfort Level Good   Using Accessory Muscles No   Mask Compliance Good   Skin Assessment Clean, dry, & intact   Settings/Measurements   PIP Observed 15 cm H20   IPAP 15 cmH20   CPAP/EPAP 5 cmH2O   Vt (Measured) 570 mL   Rate Ordered 18   FiO2  35 %   Minute Volume (L/min) 10.6 Liters   Patient's Home Machine No   Alarm Settings   Alarms On Y   Low Pressure (cmH2O) 6 cmH2O   High Pressure (cmH2O) 30 cmH2O   Apnea (secs) 20 secs   RR Low (bpm) 6   RR High (bpm) 40 br/min

## 2023-12-09 NOTE — PROGRESS NOTES
12/09/23 0345   NIV Type   NIV Started/Stopped On   Equipment Type v60   Mode Bilevel   Mask Type Full face mask   Mask Size Small   Assessment   Pulse 66   Heart Rate Source Monitor   SpO2 96 %   Breath Sounds   Breath Sounds Bilateral Diminished   Settings/Measurements   PIP Observed 15 cm H20   IPAP 15 cmH20   CPAP/EPAP 5 cmH2O   Vt (Measured) 930 mL   Rate Ordered 18   FiO2  35 %   I Time/ I Time % 1 s   Minute Volume (L/min) 16.2 Liters   Mask Leak (lpm) 57 lpm   Patient's Home Machine No   Alarm Settings   Alarms On Y   Low Pressure (cmH2O) 6 cmH2O   High Pressure (cmH2O) 30 cmH2O   Apnea (secs) 20 secs   RR Low (bpm) 6   RR High (bpm) 40 br/min   Oxygen Therapy/Pulse Ox   O2 Therapy Oxygen   O2 Device PAP (positive airway pressure)

## 2023-12-10 LAB
GLUCOSE BLD-MCNC: 162 MG/DL (ref 70–99)
GLUCOSE BLD-MCNC: 259 MG/DL (ref 70–99)
GLUCOSE BLD-MCNC: 300 MG/DL (ref 70–99)
GLUCOSE BLD-MCNC: 316 MG/DL (ref 70–99)
PERFORMED ON: ABNORMAL

## 2023-12-10 PROCEDURE — 6370000000 HC RX 637 (ALT 250 FOR IP): Performed by: INTERNAL MEDICINE

## 2023-12-10 PROCEDURE — 94761 N-INVAS EAR/PLS OXIMETRY MLT: CPT

## 2023-12-10 PROCEDURE — 94640 AIRWAY INHALATION TREATMENT: CPT

## 2023-12-10 PROCEDURE — 94669 MECHANICAL CHEST WALL OSCILL: CPT

## 2023-12-10 PROCEDURE — 1200000000 HC SEMI PRIVATE

## 2023-12-10 PROCEDURE — 6360000002 HC RX W HCPCS: Performed by: INTERNAL MEDICINE

## 2023-12-10 PROCEDURE — 99232 SBSQ HOSP IP/OBS MODERATE 35: CPT | Performed by: INTERNAL MEDICINE

## 2023-12-10 PROCEDURE — 94660 CPAP INITIATION&MGMT: CPT

## 2023-12-10 PROCEDURE — 2580000003 HC RX 258: Performed by: INTERNAL MEDICINE

## 2023-12-10 PROCEDURE — 2700000000 HC OXYGEN THERAPY PER DAY

## 2023-12-10 RX ORDER — INSULIN GLARGINE 100 [IU]/ML
22 INJECTION, SOLUTION SUBCUTANEOUS NIGHTLY
Status: DISCONTINUED | OUTPATIENT
Start: 2023-12-10 | End: 2023-12-14 | Stop reason: HOSPADM

## 2023-12-10 RX ADMIN — CEFTRIAXONE SODIUM 1000 MG: 1 INJECTION, POWDER, FOR SOLUTION INTRAMUSCULAR; INTRAVENOUS at 12:38

## 2023-12-10 RX ADMIN — ACETAMINOPHEN 650 MG: 325 TABLET, FILM COATED ORAL at 21:44

## 2023-12-10 RX ADMIN — HYDROCODONE BITARTRATE AND HOMATROPINE METHYLBROMIDE 5 ML: 5; 1.5 SOLUTION ORAL at 09:19

## 2023-12-10 RX ADMIN — Medication 2 PUFF: at 17:59

## 2023-12-10 RX ADMIN — INSULIN LISPRO 8 UNITS: 100 INJECTION, SOLUTION INTRAVENOUS; SUBCUTANEOUS at 12:35

## 2023-12-10 RX ADMIN — IPRATROPIUM BROMIDE AND ALBUTEROL SULFATE 1 DOSE: 2.5; .5 SOLUTION RESPIRATORY (INHALATION) at 21:16

## 2023-12-10 RX ADMIN — SODIUM CHLORIDE, PRESERVATIVE FREE 10 ML: 5 INJECTION INTRAVENOUS at 21:46

## 2023-12-10 RX ADMIN — ACETAMINOPHEN 650 MG: 325 TABLET, FILM COATED ORAL at 09:15

## 2023-12-10 RX ADMIN — CYANOCOBALAMIN TAB 500 MCG 500 MCG: 500 TAB at 09:15

## 2023-12-10 RX ADMIN — FUROSEMIDE 40 MG: 40 TABLET ORAL at 09:16

## 2023-12-10 RX ADMIN — FUROSEMIDE 40 MG: 40 TABLET ORAL at 16:55

## 2023-12-10 RX ADMIN — Medication 1 TABLET: at 09:16

## 2023-12-10 RX ADMIN — Medication 2 PUFF: at 00:31

## 2023-12-10 RX ADMIN — Medication 1 LOZENGE: at 16:55

## 2023-12-10 RX ADMIN — INSULIN LISPRO 12 UNITS: 100 INJECTION, SOLUTION INTRAVENOUS; SUBCUTANEOUS at 16:55

## 2023-12-10 RX ADMIN — SERTRALINE 100 MG: 50 TABLET, FILM COATED ORAL at 21:44

## 2023-12-10 RX ADMIN — ENOXAPARIN SODIUM 40 MG: 100 INJECTION SUBCUTANEOUS at 09:18

## 2023-12-10 RX ADMIN — GUANFACINE 2 MG: 1 TABLET ORAL at 21:51

## 2023-12-10 RX ADMIN — Medication 2 PUFF: at 07:26

## 2023-12-10 RX ADMIN — Medication 2 PUFF: at 20:38

## 2023-12-10 RX ADMIN — Medication 1 LOZENGE: at 21:45

## 2023-12-10 RX ADMIN — GUANFACINE 2 MG: 1 TABLET ORAL at 09:19

## 2023-12-10 RX ADMIN — ENOXAPARIN SODIUM 40 MG: 100 INJECTION SUBCUTANEOUS at 21:44

## 2023-12-10 RX ADMIN — INSULIN GLARGINE 22 UNITS: 100 INJECTION, SOLUTION SUBCUTANEOUS at 21:44

## 2023-12-10 RX ADMIN — PREDNISONE 40 MG: 20 TABLET ORAL at 09:16

## 2023-12-10 ASSESSMENT — PAIN DESCRIPTION - DESCRIPTORS
DESCRIPTORS: ACHING

## 2023-12-10 ASSESSMENT — PAIN SCALES - GENERAL
PAINLEVEL_OUTOF10: 8

## 2023-12-10 ASSESSMENT — PAIN - FUNCTIONAL ASSESSMENT: PAIN_FUNCTIONAL_ASSESSMENT: PREVENTS OR INTERFERES SOME ACTIVE ACTIVITIES AND ADLS

## 2023-12-10 ASSESSMENT — PAIN DESCRIPTION - LOCATION
LOCATION: HEAD
LOCATION: HEAD;RIB CAGE
LOCATION: RIB CAGE

## 2023-12-10 ASSESSMENT — PAIN DESCRIPTION - ORIENTATION
ORIENTATION: RIGHT
ORIENTATION: LEFT;RIGHT

## 2023-12-10 NOTE — PROGRESS NOTES
12/10/23 0339   NIV Type   Equipment Type v60   Mode Bilevel   Mask Type Full face mask   Mask Size Small   Assessment   Respirations 20   SpO2 97 %   Comfort Level Good   Using Accessory Muscles No   Mask Compliance Good   Skin Protection for O2 Device No  (pt refused)   Settings/Measurements   PIP Observed 16 cm H20   IPAP 15 cmH20   CPAP/EPAP 5 cmH2O   Vt (Measured) 460 mL   Rate Ordered 18   FiO2  30 %   I Time/ I Time % 1 s   Minute Volume (L/min) 9.8 Liters   Mask Leak (lpm) 1 lpm   Patient's Home Machine No   Alarm Settings   Alarms On Y   Low Pressure (cmH2O) 6 cmH2O   High Pressure (cmH2O) 30 cmH2O   Apnea (secs) 20 secs   RR Low (bpm) 6   RR High (bpm) 40 br/min

## 2023-12-10 NOTE — PLAN OF CARE
Problem: Pain  Goal: Verbalizes/displays adequate comfort level or baseline comfort level  Outcome: Progressing     Problem: Safety - Adult  Goal: Free from fall injury  Outcome: Progressing    Problem: ABCDS Injury Assessment  Goal: Absence of physical injury  Outcome: Progressing

## 2023-12-10 NOTE — PROGRESS NOTES
Scheduled Medications    benzocaine-menthol  1 lozenge Oral 4x daily    insulin glargine  22 Units SubCUTAneous Nightly    predniSONE  40 mg Oral Daily    furosemide  40 mg Oral BID    mometasone-formoterol  2 puff Inhalation BID RT    insulin lispro  0-16 Units SubCUTAneous TID WC    insulin lispro  0-4 Units SubCUTAneous Nightly    guanFACINE  2 mg Oral BID    sertraline  100 mg Oral QHS    vitamin B-12  500 mcg Oral Daily    therapeutic multivitamin-minerals  1 tablet Oral Daily    sodium chloride flush  5-40 mL IntraVENous 2 times per day    enoxaparin  40 mg SubCUTAneous BID    cefTRIAXone (ROCEPHIN) IV  1,000 mg IntraVENous Q24H     PRN Meds: albuterol sulfate HFA, mineral oil-hydrophilic petrolatum, benzocaine-menthol, ipratropium 0.5 mg-albuterol 2.5 mg, diatrizoate meglumine-sodium, traMADol, sodium chloride flush, sodium chloride, potassium chloride **OR** potassium alternative oral replacement **OR** potassium chloride, magnesium sulfate, ondansetron **OR** ondansetron, polyethylene glycol, acetaminophen **OR** acetaminophen, benzonatate, glucose, dextrose bolus **OR** dextrose bolus, glucagon (rDNA), dextrose     Labs:  Personally reviewed and interpreted for clinical significance. No results for input(s): \"WBC\", \"HGB\", \"HCT\", \"PLT\" in the last 72 hours. No results for input(s): \"NA\", \"K\", \"CL\", \"CO2\", \"BUN\", \"CREATININE\", \"CALCIUM\", \"MG\", \"PHOS\" in the last 72 hours. No results for input(s): \"PROBNP\", \"TROPHS\" in the last 72 hours. No results for input(s): \"LABA1C\" in the last 72 hours. No results for input(s): \"AST\", \"ALT\", \"BILIDIR\", \"BILITOT\", \"ALKPHOS\" in the last 72 hours. No results for input(s): \"INR\", \"LACTA\", \"TSH\" in the last 72 hours. Urine Cultures: No results found for: \"LABURIN\"  Blood Cultures:   Lab Results   Component Value Date/Time    BC No Growth after 4 days of incubation.  12/04/2023 10:08 AM     Lab Results   Component Value Date/Time    BLOODCULT2 No Growth after 4 days of incubation.  12/04/2023 11:03 AM     Organism: No results found for: \"ORG\"      Elvis Rosas MD

## 2023-12-10 NOTE — PROGRESS NOTES
12/10/23 0040   NIV Type   $NIV $Daily Charge   NIV Started/Stopped On   Equipment Type v60   Mode Bilevel   Mask Type Full face mask   Mask Size Small   Assessment   Respirations 20   SpO2 99 %   Comfort Level Good   Using Accessory Muscles No   Mask Compliance Good   Skin Assessment Clean, dry, & intact   Skin Protection for O2 Device No  (pt refused)   Settings/Measurements   PIP Observed 15 cm H20   IPAP 15 cmH20   CPAP/EPAP 5 cmH2O   Vt (Measured) 745 mL   Rate Ordered 18   FiO2  30 %   I Time/ I Time % 1 s   Minute Volume (L/min) 13.3 Liters   Mask Leak (lpm) 1 lpm   Patient's Home Machine No   Alarm Settings   Alarms On Y   Low Pressure (cmH2O) 6 cmH2O   High Pressure (cmH2O) 30 cmH2O   Apnea (secs) 20 secs   RR Low (bpm) 6   RR High (bpm) 40 br/min

## 2023-12-11 LAB
ALBUMIN SERPL-MCNC: 3.7 G/DL (ref 3.4–5)
ANION GAP SERPL CALCULATED.3IONS-SCNC: 13 MMOL/L (ref 3–16)
BASOPHILS # BLD: 0 K/UL (ref 0–0.2)
BASOPHILS NFR BLD: 0 %
BUN SERPL-MCNC: 15 MG/DL (ref 7–20)
CALCIUM SERPL-MCNC: 9.1 MG/DL (ref 8.3–10.6)
CHLORIDE SERPL-SCNC: 97 MMOL/L (ref 99–110)
CO2 SERPL-SCNC: 26 MMOL/L (ref 21–32)
CREAT SERPL-MCNC: 0.6 MG/DL (ref 0.6–1.1)
DEPRECATED RDW RBC AUTO: 13 % (ref 12.4–15.4)
EOSINOPHIL # BLD: 0.1 K/UL (ref 0–0.6)
EOSINOPHIL NFR BLD: 1 %
GFR SERPLBLD CREATININE-BSD FMLA CKD-EPI: >60 ML/MIN/{1.73_M2}
GLUCOSE BLD-MCNC: 135 MG/DL (ref 70–99)
GLUCOSE BLD-MCNC: 169 MG/DL (ref 70–99)
GLUCOSE BLD-MCNC: 238 MG/DL (ref 70–99)
GLUCOSE BLD-MCNC: 251 MG/DL (ref 70–99)
GLUCOSE BLD-MCNC: 326 MG/DL (ref 70–99)
GLUCOSE SERPL-MCNC: 144 MG/DL (ref 70–99)
HCT VFR BLD AUTO: 41.1 % (ref 36–48)
HGB BLD-MCNC: 13.8 G/DL (ref 12–16)
LYMPHOCYTES # BLD: 1.7 K/UL (ref 1–5.1)
LYMPHOCYTES NFR BLD: 17 %
MAGNESIUM SERPL-MCNC: 2.2 MG/DL (ref 1.8–2.4)
MCH RBC QN AUTO: 30.3 PG (ref 26–34)
MCHC RBC AUTO-ENTMCNC: 33.6 G/DL (ref 31–36)
MCV RBC AUTO: 90.2 FL (ref 80–100)
METAMYELOCYTES NFR BLD MANUAL: 1 %
MONOCYTES # BLD: 0.9 K/UL (ref 0–1.3)
MONOCYTES NFR BLD: 9 %
NEUTROPHILS # BLD: 7.4 K/UL (ref 1.7–7.7)
NEUTROPHILS NFR BLD: 66 %
NEUTS BAND NFR BLD MANUAL: 6 % (ref 0–7)
PERFORMED ON: ABNORMAL
PHOSPHATE SERPL-MCNC: 4.3 MG/DL (ref 2.5–4.9)
PLATELET # BLD AUTO: 294 K/UL (ref 135–450)
PLATELET BLD QL SMEAR: ADEQUATE
PMV BLD AUTO: 7.8 FL (ref 5–10.5)
POTASSIUM SERPL-SCNC: 3.1 MMOL/L (ref 3.5–5.1)
RBC # BLD AUTO: 4.56 M/UL (ref 4–5.2)
SLIDE REVIEW: ABNORMAL
SODIUM SERPL-SCNC: 136 MMOL/L (ref 136–145)
WBC # BLD AUTO: 10.1 K/UL (ref 4–11)

## 2023-12-11 PROCEDURE — 36415 COLL VENOUS BLD VENIPUNCTURE: CPT

## 2023-12-11 PROCEDURE — 83735 ASSAY OF MAGNESIUM: CPT

## 2023-12-11 PROCEDURE — 6370000000 HC RX 637 (ALT 250 FOR IP): Performed by: INTERNAL MEDICINE

## 2023-12-11 PROCEDURE — 85025 COMPLETE CBC W/AUTO DIFF WBC: CPT

## 2023-12-11 PROCEDURE — 2700000000 HC OXYGEN THERAPY PER DAY

## 2023-12-11 PROCEDURE — 94669 MECHANICAL CHEST WALL OSCILL: CPT

## 2023-12-11 PROCEDURE — 99232 SBSQ HOSP IP/OBS MODERATE 35: CPT | Performed by: INTERNAL MEDICINE

## 2023-12-11 PROCEDURE — 94660 CPAP INITIATION&MGMT: CPT

## 2023-12-11 PROCEDURE — 80069 RENAL FUNCTION PANEL: CPT

## 2023-12-11 PROCEDURE — 94761 N-INVAS EAR/PLS OXIMETRY MLT: CPT

## 2023-12-11 PROCEDURE — 94640 AIRWAY INHALATION TREATMENT: CPT

## 2023-12-11 PROCEDURE — 2580000003 HC RX 258: Performed by: INTERNAL MEDICINE

## 2023-12-11 PROCEDURE — 1200000000 HC SEMI PRIVATE

## 2023-12-11 PROCEDURE — 97535 SELF CARE MNGMENT TRAINING: CPT

## 2023-12-11 PROCEDURE — 6360000002 HC RX W HCPCS: Performed by: NURSE PRACTITIONER

## 2023-12-11 PROCEDURE — 97110 THERAPEUTIC EXERCISES: CPT

## 2023-12-11 PROCEDURE — 6360000002 HC RX W HCPCS: Performed by: INTERNAL MEDICINE

## 2023-12-11 RX ORDER — LANOLIN ALCOHOL/MO/W.PET/CERES
1000 CREAM (GRAM) TOPICAL DAILY
COMMUNITY

## 2023-12-11 RX ORDER — LOSARTAN POTASSIUM 100 MG/1
100 TABLET ORAL DAILY
Status: DISCONTINUED | OUTPATIENT
Start: 2023-12-11 | End: 2023-12-12 | Stop reason: ALTCHOICE

## 2023-12-11 RX ORDER — HYDRALAZINE HYDROCHLORIDE 20 MG/ML
10 INJECTION INTRAMUSCULAR; INTRAVENOUS EVERY 6 HOURS PRN
Status: DISCONTINUED | OUTPATIENT
Start: 2023-12-11 | End: 2023-12-14 | Stop reason: HOSPADM

## 2023-12-11 RX ORDER — IBUPROFEN 200 MG
400 TABLET ORAL DAILY PRN
COMMUNITY

## 2023-12-11 RX ORDER — LABETALOL HYDROCHLORIDE 5 MG/ML
10 INJECTION, SOLUTION INTRAVENOUS EVERY 4 HOURS PRN
Status: DISCONTINUED | OUTPATIENT
Start: 2023-12-11 | End: 2023-12-14 | Stop reason: HOSPADM

## 2023-12-11 RX ORDER — POTASSIUM CHLORIDE 750 MG/1
2 TABLET, EXTENDED RELEASE ORAL 2 TIMES DAILY
COMMUNITY

## 2023-12-11 RX ADMIN — Medication 1 TABLET: at 08:16

## 2023-12-11 RX ADMIN — ACETAMINOPHEN 650 MG: 325 TABLET, FILM COATED ORAL at 08:50

## 2023-12-11 RX ADMIN — GUANFACINE 2 MG: 1 TABLET ORAL at 22:07

## 2023-12-11 RX ADMIN — ENOXAPARIN SODIUM 40 MG: 100 INJECTION SUBCUTANEOUS at 22:08

## 2023-12-11 RX ADMIN — POTASSIUM CHLORIDE 40 MEQ: 1500 TABLET, EXTENDED RELEASE ORAL at 08:16

## 2023-12-11 RX ADMIN — Medication 2 PUFF: at 08:03

## 2023-12-11 RX ADMIN — Medication 2 PUFF: at 23:51

## 2023-12-11 RX ADMIN — Medication 2 PUFF: at 18:27

## 2023-12-11 RX ADMIN — Medication 2 PUFF: at 19:47

## 2023-12-11 RX ADMIN — Medication 1 LOZENGE: at 22:06

## 2023-12-11 RX ADMIN — Medication 1 LOZENGE: at 12:53

## 2023-12-11 RX ADMIN — Medication 1 LOZENGE: at 08:19

## 2023-12-11 RX ADMIN — LABETALOL HYDROCHLORIDE 10 MG: 5 INJECTION INTRAVENOUS at 12:08

## 2023-12-11 RX ADMIN — PREDNISONE 40 MG: 20 TABLET ORAL at 08:16

## 2023-12-11 RX ADMIN — Medication 1 LOZENGE: at 15:40

## 2023-12-11 RX ADMIN — FUROSEMIDE 40 MG: 40 TABLET ORAL at 08:16

## 2023-12-11 RX ADMIN — IPRATROPIUM BROMIDE AND ALBUTEROL SULFATE 1 DOSE: 2.5; .5 SOLUTION RESPIRATORY (INHALATION) at 19:54

## 2023-12-11 RX ADMIN — SODIUM CHLORIDE, PRESERVATIVE FREE 10 ML: 5 INJECTION INTRAVENOUS at 22:09

## 2023-12-11 RX ADMIN — FUROSEMIDE 40 MG: 40 TABLET ORAL at 17:50

## 2023-12-11 RX ADMIN — CEFTRIAXONE SODIUM 1000 MG: 1 INJECTION, POWDER, FOR SOLUTION INTRAMUSCULAR; INTRAVENOUS at 10:52

## 2023-12-11 RX ADMIN — INSULIN GLARGINE 22 UNITS: 100 INJECTION, SOLUTION SUBCUTANEOUS at 22:07

## 2023-12-11 RX ADMIN — INSULIN LISPRO 8 UNITS: 100 INJECTION, SOLUTION INTRAVENOUS; SUBCUTANEOUS at 11:58

## 2023-12-11 RX ADMIN — CYANOCOBALAMIN TAB 500 MCG 500 MCG: 500 TAB at 08:15

## 2023-12-11 RX ADMIN — GUANFACINE 2 MG: 1 TABLET ORAL at 10:50

## 2023-12-11 RX ADMIN — INSULIN LISPRO 12 UNITS: 100 INJECTION, SOLUTION INTRAVENOUS; SUBCUTANEOUS at 16:31

## 2023-12-11 RX ADMIN — SERTRALINE 100 MG: 50 TABLET, FILM COATED ORAL at 22:07

## 2023-12-11 RX ADMIN — Medication 2 PUFF: at 00:23

## 2023-12-11 RX ADMIN — HYDRALAZINE HYDROCHLORIDE 10 MG: 20 INJECTION, SOLUTION INTRAMUSCULAR; INTRAVENOUS at 15:37

## 2023-12-11 RX ADMIN — ENOXAPARIN SODIUM 40 MG: 100 INJECTION SUBCUTANEOUS at 08:16

## 2023-12-11 RX ADMIN — LOSARTAN POTASSIUM 100 MG: 100 TABLET, FILM COATED ORAL at 10:50

## 2023-12-11 ASSESSMENT — PAIN DESCRIPTION - DESCRIPTORS: DESCRIPTORS: SORE

## 2023-12-11 ASSESSMENT — PAIN - FUNCTIONAL ASSESSMENT: PAIN_FUNCTIONAL_ASSESSMENT: ACTIVITIES ARE NOT PREVENTED

## 2023-12-11 ASSESSMENT — PAIN DESCRIPTION - LOCATION
LOCATION: HEAD
LOCATION: THROAT

## 2023-12-11 ASSESSMENT — PAIN SCALES - GENERAL
PAINLEVEL_OUTOF10: 8
PAINLEVEL_OUTOF10: 7

## 2023-12-11 NOTE — PROGRESS NOTES
Occupational Therapy  Facility/Department: [facility] C3 TELE/MED SURG/ONC  Daily Treatment Note  NAME: Destiny Castillo  : 1965  MRN: [id number]  Date of Service: 2023    Discharge Recommendations:  Home with assist PRN, Home with Home health OT  OT Equipment Recommendations  Equipment Needed: No    AM-PAC score  AM-PAC Inpatient Daily Activity Raw Score: 18 (23 153)  AM-PAC Inpatient ADL T-Scale Score : 38.66 (23 153)  ADL Inpatient CMS 0-100% Score: 46.65 (23 153)  ADL Inpatient CMS G-Code Modifier : CK (23)    Patient Diagnosis(es): The primary encounter diagnosis was Acute respiratory failure with hypoxia ([address]). Diagnoses of Acute respiratory distress, Lactic acidosis, Hypoxia, COPD exacerbation (HCC), Pneumonia of right lower lobe due to infectious organism, and Severe sepsis [facility]) were also pertinent to this visit. Assessment    Assessment: Pt received for OT session resting in bedside chair to address current functional deficits that inhibit independence and safety with ADLs and functional mobility. Pt agreeable to session, reporting 7/10 pain in head. Pt with high BP of 202/78, RN notified. Kept session at chair level d/t BP. Completed teeth brushing and face washing w/ setup and increased time. LB dressing (doffing socks) and applying lotion to BLEs done w/ totalA. Educated pt on importance of checking feet for wounds and importance of elevating feet for decreasing swelling. Pt will continue to benefit from skilled OT while in acute setting to increase functional activity tolerance, safety and independence w/ ADLs, and increase strength. Pt making fair progress towards personalized OT goals. Continued recs for home health OT and assist as needed at home. Continue POC.      Activity Tolerance: Patient tolerated treatment well  Discharge Recommendations: Home with assist PRN;Home with Home health OT  Equipment Needed: No      Plan   Occupational Therapy Plan  Times Per To: Patient  Education Provided: Role of Therapy;Transfer Training;Plan of Care;ADL Adaptive Strategies; Energy Conservation;Equipment  Education Provided Comments: role of OT, importance of OOB activity, AE for LB dressing/bathing, importance of checking feet for wounds, elevating feet  Education Method: Verbal  Barriers to Learning: None  Education Outcome: Verbalized understanding;Continued education needed    Goals all non-met goals ongoing as of 12/11/2023.   Short Term Goals  Time Frame for Short Term Goals: 1 week (12/13/23) - all goals ongoing 12/7  Short Term Goal 1: Pt will complete functional/toilet transfer with with SPV and AD 12/10/23  Short Term Goal 2: Pt will complete 1-2 grooming tasks at sink with SBA  Short Term Goal 3: Pt will complete UB dressing with min A  Short Term Goal 4: Pt will complete 3x15 BUE ex to increase IND in transfers  Patient Goals   Patient goals : \"to go home\"    AM-PAC - ADL  AM-PAC Daily Activity - Inpatient   How much help is needed for putting on and taking off regular lower body clothing?: A Lot  How much help is needed for bathing (which includes washing, rinsing, drying)?: A Lot  How much help is needed for toileting (which includes using toilet, bedpan, or urinal)?: None  How much help is needed for putting on and taking off regular upper body clothing?: A Little  How much help is needed for taking care of personal grooming?: A Little  How much help for eating meals?: None  AM-PAC Inpatient Daily Activity Raw Score: 18  AM-PAC Inpatient ADL T-Scale Score : 38.66  ADL Inpatient CMS 0-100% Score: 46.65  ADL Inpatient CMS G-Code Modifier : CK    Therapy Time   Individual Concurrent Group Co-treatment   Time In 1450         Time Out 1535         Minutes 45         Timed Code Treatment Minutes: [address] , OT

## 2023-12-11 NOTE — PLAN OF CARE
Problem: Pain  Goal: Verbalizes/displays adequate comfort level or baseline comfort level  Outcome: Progressing   Pt a/o, rates pain appropriately using 0-10 pain scale; pt calls out as needed for pain intervention; will continue to monitor and administer intervention as ordered and requested. Problem: Safety - Adult  Goal: Free from fall injury  Outcome: Progressing   Pt a/o. Verbalized understanding of calling for assistance. Observed steady gait. Room well lit and free of clutter. Bed locked and in lowest position. Will continue to monitor.

## 2023-12-11 NOTE — PROGRESS NOTES
Hospital Medicine Progress Note      Date of Admission: 12/4/2023  Hospital Day: 8      Chief Admission Complaint:  sob     Subjective:  resting in chair, ongoing malaiase but much improved from admission, , now on room air, BP not controlled    Presenting Admission History:         60 y.o. female  with hx of dm2, obesity, MEHUL(on cpap) who presented to [facility] with sob and fatigue. Pt noted symptoms beginning last thurs with dry cough that became productive. She had assoc f/chills with temp of 101 at home. She denied myalgias/arthralgias. She also noted orthopnea(smothering feeling with laying flat) and PITTMAN for the past 1 week. No chest pain/pressure but rib pain due to coughing. She tried Nancye Dory and old abx(took on Saturday) until seeing  PCP today. In the office, PCP noted o2 in the 80s, bp was 200s/100s, ht rate in the 100s, therefore called EMS.   -had assoc fatigue  -No n/v  -she notes chronic diarrhea  -No appetite  -pt feels similar to prior pna  -she tried home inhaler w/o relief  -she uses cpap( Pressure of 18, used since 1992)  -notes weeping on legs sometimes  -she has chronic Back pain  -she is Not on oxygen at home  -she does not see a pulmonologist        Assessment/Plan:       Current Principal Problem:  Sepsis ([address])     Sepsis- with tachycardia/tachypnea, fever of 101 at home, +pulm source  -Bcx ngtd  -Iv abx started 12/4  --given Ivfs  -Trended lactate and normalized  -Ua was ordered in ER, no infection noted     Sob/hypoxia- transient, unclear etiology, CXR noted possible mass  -CT chest done, neg for PE but noted rt sided pna  -supp o2 if needed  -Duonebs scheduled  -Mucinex  -strep pna/legionella ordered and neg  -Continued tenex  -Tessalon perles   -pulm consulted 12/6 given lack of signif improvement, apprec recs, noted bronchial asthma  -decadron po started 12/5 pm, switched to prednisone by pulm   -echo ordered(ef 55%, cannot tell wm abn)   -started dulera  - lasix restarted

## 2023-12-11 NOTE — PROGRESS NOTES
12/11/23 0042   NIV Type   $NIV $Daily Charge   NIV Started/Stopped On   Equipment Type v60   Mode Bilevel   Mask Type Full face mask   Mask Size Small   Assessment   Respirations 25   SpO2 98 %   Comfort Level Good   Using Accessory Muscles No   Mask Compliance Good   Skin Assessment Clean, dry, & intact   Skin Protection for O2 Device No  (pt does not want mepilex)   Settings/Measurements   PIP Observed 15 cm H20   IPAP 15 cmH20   CPAP/EPAP 5 cmH2O   Vt (Measured) 746 mL   Rate Ordered 18   FiO2  30 %   I Time/ I Time % 1 s   Minute Volume (L/min) 12.6 Liters   Mask Leak (lpm) 2 lpm   Patient's Home Machine No   Alarm Settings   Alarms On Y   Low Pressure (cmH2O) 6 cmH2O   High Pressure (cmH2O) 30 cmH2O   Apnea (secs) 20 secs   RR Low (bpm) 6   RR High (bpm) 40 br/min

## 2023-12-11 NOTE — PROGRESS NOTES
12/11/23 0326   NIV Type   Equipment Type v60   Mode Bilevel   Mask Type Full face mask   Mask Size Small   Assessment   Respirations 22   SpO2 97 %   Comfort Level Good   Using Accessory Muscles No   Mask Compliance Good   Skin Protection for O2 Device No  (pt refused)   Settings/Measurements   PIP Observed 16 cm H20   IPAP 15 cmH20   CPAP/EPAP 5 cmH2O   Vt (Measured) 455 mL   Rate Ordered 18   FiO2  30 %   I Time/ I Time % 1 s   Minute Volume (L/min) 9 Liters   Mask Leak (lpm) 0 lpm   Patient's Home Machine No   Alarm Settings   Alarms On Y   Low Pressure (cmH2O) 6 cmH2O   High Pressure (cmH2O) 30 cmH2O   Apnea (secs) 20 secs   RR Low (bpm) 6   RR High (bpm) 40 br/min

## 2023-12-11 NOTE — CARE COORDINATION
Chart reviewed day 7. Care managed by pulmonology and IM. Currently on RA with sat of 92%. Finishing atbx today. Therapy with recs for [facility], had declined. May need lyft.  Karine Win RN

## 2023-12-11 NOTE — PROGRESS NOTES
12/10/23 4966   NIV Type   NIV Started/Stopped Off  (pt is not ready for BIPAP at this time.  RT will continue to monitor.)

## 2023-12-12 LAB
ALBUMIN SERPL-MCNC: 3.8 G/DL (ref 3.4–5)
ANION GAP SERPL CALCULATED.3IONS-SCNC: 12 MMOL/L (ref 3–16)
BASOPHILS # BLD: 0.1 K/UL (ref 0–0.2)
BASOPHILS NFR BLD: 0.9 %
BUN SERPL-MCNC: 16 MG/DL (ref 7–20)
CALCIUM SERPL-MCNC: 9 MG/DL (ref 8.3–10.6)
CHLORIDE SERPL-SCNC: 99 MMOL/L (ref 99–110)
CO2 SERPL-SCNC: 28 MMOL/L (ref 21–32)
CREAT SERPL-MCNC: 0.6 MG/DL (ref 0.6–1.1)
DEPRECATED RDW RBC AUTO: 13.2 % (ref 12.4–15.4)
EOSINOPHIL # BLD: 0.2 K/UL (ref 0–0.6)
EOSINOPHIL NFR BLD: 1.5 %
GFR SERPLBLD CREATININE-BSD FMLA CKD-EPI: >60 ML/MIN/{1.73_M2}
GLUCOSE BLD-MCNC: 130 MG/DL (ref 70–99)
GLUCOSE BLD-MCNC: 201 MG/DL (ref 70–99)
GLUCOSE BLD-MCNC: 209 MG/DL (ref 70–99)
GLUCOSE BLD-MCNC: 237 MG/DL (ref 70–99)
GLUCOSE SERPL-MCNC: 129 MG/DL (ref 70–99)
HCT VFR BLD AUTO: 34.5 % (ref 36–48)
HGB BLD-MCNC: 11.6 G/DL (ref 12–16)
LYMPHOCYTES # BLD: 2.2 K/UL (ref 1–5.1)
LYMPHOCYTES NFR BLD: 18.8 %
MAGNESIUM SERPL-MCNC: 2.1 MG/DL (ref 1.8–2.4)
MCH RBC QN AUTO: 30.5 PG (ref 26–34)
MCHC RBC AUTO-ENTMCNC: 33.7 G/DL (ref 31–36)
MCV RBC AUTO: 90.6 FL (ref 80–100)
MONOCYTES # BLD: 0.9 K/UL (ref 0–1.3)
MONOCYTES NFR BLD: 7.1 %
NEUTROPHILS # BLD: 8.5 K/UL (ref 1.7–7.7)
NEUTROPHILS NFR BLD: 71.7 %
PERFORMED ON: ABNORMAL
PHOSPHATE SERPL-MCNC: 4.5 MG/DL (ref 2.5–4.9)
PLATELET # BLD AUTO: 389 K/UL (ref 135–450)
PMV BLD AUTO: 7.4 FL (ref 5–10.5)
POTASSIUM SERPL-SCNC: 3.3 MMOL/L (ref 3.5–5.1)
RBC # BLD AUTO: 3.8 M/UL (ref 4–5.2)
SODIUM SERPL-SCNC: 139 MMOL/L (ref 136–145)
WBC # BLD AUTO: 11.9 K/UL (ref 4–11)

## 2023-12-12 PROCEDURE — 6370000000 HC RX 637 (ALT 250 FOR IP): Performed by: INTERNAL MEDICINE

## 2023-12-12 PROCEDURE — 2700000000 HC OXYGEN THERAPY PER DAY

## 2023-12-12 PROCEDURE — 2580000003 HC RX 258: Performed by: INTERNAL MEDICINE

## 2023-12-12 PROCEDURE — 85025 COMPLETE CBC W/AUTO DIFF WBC: CPT

## 2023-12-12 PROCEDURE — 94660 CPAP INITIATION&MGMT: CPT

## 2023-12-12 PROCEDURE — 94669 MECHANICAL CHEST WALL OSCILL: CPT

## 2023-12-12 PROCEDURE — 6360000002 HC RX W HCPCS: Performed by: NURSE PRACTITIONER

## 2023-12-12 PROCEDURE — 83735 ASSAY OF MAGNESIUM: CPT

## 2023-12-12 PROCEDURE — 97110 THERAPEUTIC EXERCISES: CPT

## 2023-12-12 PROCEDURE — 99232 SBSQ HOSP IP/OBS MODERATE 35: CPT | Performed by: INTERNAL MEDICINE

## 2023-12-12 PROCEDURE — 94761 N-INVAS EAR/PLS OXIMETRY MLT: CPT

## 2023-12-12 PROCEDURE — 80069 RENAL FUNCTION PANEL: CPT

## 2023-12-12 PROCEDURE — 6370000000 HC RX 637 (ALT 250 FOR IP): Performed by: NURSE PRACTITIONER

## 2023-12-12 PROCEDURE — 97116 GAIT TRAINING THERAPY: CPT

## 2023-12-12 PROCEDURE — 1200000000 HC SEMI PRIVATE

## 2023-12-12 PROCEDURE — 94640 AIRWAY INHALATION TREATMENT: CPT

## 2023-12-12 PROCEDURE — 6360000002 HC RX W HCPCS: Performed by: INTERNAL MEDICINE

## 2023-12-12 RX ORDER — FUROSEMIDE 80 MG
80 TABLET ORAL DAILY
Status: DISCONTINUED | OUTPATIENT
Start: 2023-12-13 | End: 2023-12-14 | Stop reason: HOSPADM

## 2023-12-12 RX ORDER — FUROSEMIDE 40 MG/1
40 TABLET ORAL ONCE
Status: COMPLETED | OUTPATIENT
Start: 2023-12-12 | End: 2023-12-12

## 2023-12-12 RX ORDER — TELMISARTAN 80 MG/1
80 TABLET ORAL DAILY
Status: DISCONTINUED | OUTPATIENT
Start: 2023-12-13 | End: 2023-12-14 | Stop reason: HOSPADM

## 2023-12-12 RX ORDER — AMLODIPINE BESYLATE 2.5 MG/1
2.5 TABLET ORAL ONCE
Status: COMPLETED | OUTPATIENT
Start: 2023-12-12 | End: 2023-12-12

## 2023-12-12 RX ORDER — AMLODIPINE BESYLATE 5 MG/1
5 TABLET ORAL DAILY
Status: DISCONTINUED | OUTPATIENT
Start: 2023-12-13 | End: 2023-12-13

## 2023-12-12 RX ORDER — IPRATROPIUM BROMIDE AND ALBUTEROL SULFATE 2.5; .5 MG/3ML; MG/3ML
1 SOLUTION RESPIRATORY (INHALATION)
Status: DISCONTINUED | OUTPATIENT
Start: 2023-12-12 | End: 2023-12-13

## 2023-12-12 RX ADMIN — Medication 1 LOZENGE: at 22:22

## 2023-12-12 RX ADMIN — ENOXAPARIN SODIUM 40 MG: 100 INJECTION SUBCUTANEOUS at 22:11

## 2023-12-12 RX ADMIN — Medication 1 LOZENGE: at 09:48

## 2023-12-12 RX ADMIN — SERTRALINE 100 MG: 50 TABLET, FILM COATED ORAL at 22:12

## 2023-12-12 RX ADMIN — INSULIN LISPRO 4 UNITS: 100 INJECTION, SOLUTION INTRAVENOUS; SUBCUTANEOUS at 17:43

## 2023-12-12 RX ADMIN — HYDRALAZINE HYDROCHLORIDE 10 MG: 20 INJECTION, SOLUTION INTRAMUSCULAR; INTRAVENOUS at 06:00

## 2023-12-12 RX ADMIN — Medication 1 TABLET: at 09:50

## 2023-12-12 RX ADMIN — FUROSEMIDE 40 MG: 40 TABLET ORAL at 09:50

## 2023-12-12 RX ADMIN — ENOXAPARIN SODIUM 40 MG: 100 INJECTION SUBCUTANEOUS at 09:48

## 2023-12-12 RX ADMIN — TRAMADOL HYDROCHLORIDE 50 MG: 50 TABLET ORAL at 09:48

## 2023-12-12 RX ADMIN — HYDRALAZINE HYDROCHLORIDE 10 MG: 20 INJECTION, SOLUTION INTRAMUSCULAR; INTRAVENOUS at 15:25

## 2023-12-12 RX ADMIN — IPRATROPIUM BROMIDE AND ALBUTEROL SULFATE 1 DOSE: 2.5; .5 SOLUTION RESPIRATORY (INHALATION) at 07:44

## 2023-12-12 RX ADMIN — INSULIN GLARGINE 22 UNITS: 100 INJECTION, SOLUTION SUBCUTANEOUS at 22:12

## 2023-12-12 RX ADMIN — LABETALOL HYDROCHLORIDE 10 MG: 5 INJECTION INTRAVENOUS at 10:36

## 2023-12-12 RX ADMIN — Medication 1 LOZENGE: at 17:44

## 2023-12-12 RX ADMIN — SODIUM CHLORIDE, PRESERVATIVE FREE 10 ML: 5 INJECTION INTRAVENOUS at 22:17

## 2023-12-12 RX ADMIN — LABETALOL HYDROCHLORIDE 10 MG: 5 INJECTION INTRAVENOUS at 00:58

## 2023-12-12 RX ADMIN — ACETAMINOPHEN 650 MG: 325 TABLET, FILM COATED ORAL at 00:55

## 2023-12-12 RX ADMIN — HYDRALAZINE HYDROCHLORIDE 10 MG: 20 INJECTION, SOLUTION INTRAMUSCULAR; INTRAVENOUS at 22:18

## 2023-12-12 RX ADMIN — POTASSIUM BICARBONATE 40 MEQ: 782 TABLET, EFFERVESCENT ORAL at 10:59

## 2023-12-12 RX ADMIN — Medication 2 PUFF: at 20:21

## 2023-12-12 RX ADMIN — CYANOCOBALAMIN TAB 500 MCG 500 MCG: 500 TAB at 09:49

## 2023-12-12 RX ADMIN — PREDNISONE 30 MG: 20 TABLET ORAL at 09:49

## 2023-12-12 RX ADMIN — INSULIN LISPRO 4 UNITS: 100 INJECTION, SOLUTION INTRAVENOUS; SUBCUTANEOUS at 12:06

## 2023-12-12 RX ADMIN — IPRATROPIUM BROMIDE AND ALBUTEROL SULFATE 1 DOSE: 2.5; .5 SOLUTION RESPIRATORY (INHALATION) at 20:17

## 2023-12-12 RX ADMIN — GUANFACINE 2 MG: 1 TABLET ORAL at 09:49

## 2023-12-12 RX ADMIN — GUANFACINE 2 MG: 1 TABLET ORAL at 22:20

## 2023-12-12 RX ADMIN — LOSARTAN POTASSIUM 100 MG: 100 TABLET, FILM COATED ORAL at 09:50

## 2023-12-12 RX ADMIN — AMLODIPINE BESYLATE 2.5 MG: 2.5 TABLET ORAL at 22:13

## 2023-12-12 RX ADMIN — Medication 1 LOZENGE: at 12:07

## 2023-12-12 RX ADMIN — POTASSIUM BICARBONATE 40 MEQ: 782 TABLET, EFFERVESCENT ORAL at 22:13

## 2023-12-12 RX ADMIN — Medication 2 PUFF: at 07:44

## 2023-12-12 RX ADMIN — FUROSEMIDE 40 MG: 40 TABLET ORAL at 10:59

## 2023-12-12 ASSESSMENT — PAIN DESCRIPTION - LOCATION
LOCATION: HEAD
LOCATION: HEAD
LOCATION: THROAT

## 2023-12-12 ASSESSMENT — PAIN DESCRIPTION - ORIENTATION
ORIENTATION: UPPER
ORIENTATION: ANTERIOR

## 2023-12-12 ASSESSMENT — PAIN SCALES - GENERAL
PAINLEVEL_OUTOF10: 8
PAINLEVEL_OUTOF10: 8
PAINLEVEL_OUTOF10: 4

## 2023-12-12 ASSESSMENT — PAIN DESCRIPTION - DESCRIPTORS
DESCRIPTORS: ACHING

## 2023-12-12 ASSESSMENT — PAIN - FUNCTIONAL ASSESSMENT: PAIN_FUNCTIONAL_ASSESSMENT: ACTIVITIES ARE NOT PREVENTED

## 2023-12-12 NOTE — PROGRESS NOTES
Assessment completed and documented. VSS. //78, gave prn labetalol per MAR, Breath sounds inspiratory wheezing. Pt has intermittent cough. A/ox4. Pt ambulates to bathroom without difficulty. Denies pain. Bed locked and in lowest position. Bedside table and call light within reach. Denies further needs at this time.

## 2023-12-12 NOTE — PROGRESS NOTES
12/12/23 0139   NIV Type   $NIV $Daily Charge   NIV Started/Stopped On   Equipment Type v60   Mode Bilevel   Mask Type Full face mask   Mask Size Small   Assessment   Respirations 22   SpO2 98 %   Comfort Level Good   Using Accessory Muscles No   Mask Compliance Good   Skin Protection for O2 Device No  (pt does not want mepilex)   Settings/Measurements   PIP Observed 16 cm H20   IPAP 15 cmH20   CPAP/EPAP 5 cmH2O   Vt (Measured) 696 mL   Rate Ordered 18   FiO2  30 %   I Time/ I Time % 1 s   Minute Volume (L/min) 13.8 Liters   Mask Leak (lpm) 0 lpm   Patient's Home Machine No   Alarm Settings   Alarms On Y   Low Pressure (cmH2O) 6 cmH2O   High Pressure (cmH2O) 30 cmH2O   Apnea (secs) 20 secs   RR Low (bpm) 6   RR High (bpm) 40 br/min

## 2023-12-12 NOTE — PROGRESS NOTES
denies  Orientation  Overall Orientation Status: Within Functional Limits  Orientation Level: Oriented X4  Cognition  Overall Cognitive Status: WFL     Objective   Vitals  Pulse: 72  Heart Rate Source: Monitor  BP: (!) 193/79  BP Location: Right lower arm  BP Method: Automatic  Patient Position: Sitting  MAP (Calculated): 117  SpO2: 94 %  O2 Device: None (Room air)  Comment: post activity  Bed Mobility Training  Bed Mobility Training: No  Balance  Sitting: Intact  Standing: Impaired  Standing - Static: Good  Standing - Dynamic: Fair;Good  Transfer Training  Transfer Training: Yes  Interventions: Safety awareness training;Verbal cues  Sit to Stand: Independent  Stand to Sit: Independent  Gait Training  Gait Training: Yes  Gait  Overall Level of Assistance: Stand-by assistance  Distance (ft): 15 Feet  Assistive Device: Terry García; Other (comment) (and no AD trialed)  Interventions: Verbal cues  Base of Support: Widened  Speed/Yvonne: Pace decreased (< 100 feet/min); Slow  Step Length: Left shortened;Right shortened  Wheelchair Management  Wheelchair Management: No     PT Exercises  Exercise Treatment: seated BLE exercises x10 AP, LAQ, marches     Safety Devices  Type of Devices: All fall risk precautions in place;Call light within reach; Left in chair;Nurse notified  Restraints  Restraints Initially in Place: No       Goals  Short Term Goals  Time Frame for Short Term Goals: 1 week, 12/13/23 (unless otherwise specified)  Short Term Goal 1: Pt will transfer supine <-> sit with modified(I)  Short Term Goal 2: Pt will transfer sit <-> stand and bed>chair using least AD with supervision/(I) --12/12 MET  Short Term Goal 3: Pt will ambulate x 100 feet using least AD with supervision/(I)  Short Term Goal 4: By 12/09/23: Pt will tolerate 12-15 reps BLE exercise for strengthening, balance, and endurance --12/12 MET  Short Term Goal 5: Pt will ambulate up/down 2 steps without handrails using appropriate AD as needed with supervision  Patient Goals   Patient Goals :  \"To breathe easier & go home\"    Education  Patient Education  Education Given To: Patient  Education Provided: Role of Therapy;Plan of Care;Precautions;Transfer Training;Energy Conservation;Equipment  Education Method: Verbal  Barriers to Learning: None  Education Outcome: Verbalized understanding;Demonstrated understanding    AM-PAC - Mobility    AM-PAC Basic Mobility - Inpatient   How much help is needed turning from your back to your side while in a flat bed without using bedrails?: A Little  How much help is needed moving from lying on your back to sitting on the side of a flat bed without using bedrails?: A Little  How much help is needed moving to and from a bed to a chair?: A Little  How much help is needed standing up from a chair using your arms?: None  How much help is needed walking in hospital room?: A Little  How much help is needed climbing 3-5 steps with a railing?: A Little  AM-PAC Inpatient Mobility Raw Score : 19  AM-PAC Inpatient T-Scale Score : 45.44  Mobility Inpatient CMS 0-100% Score: 41.77  Mobility Inpatient CMS G-Code Modifier : CK         Therapy Time   Individual Concurrent Group Co-treatment   Time In 1515         Time Out 1542         Minutes 27         Timed Code Treatment Minutes: [facility],

## 2023-12-12 NOTE — PROGRESS NOTES
Pt a/o. HTN still, MD aware. Lungs sound pretty clear, occasional expiratory wheeze. Stable on RA. Pt c/o H/A but no vision issues. Occasional congested cough but not getting very much up except after chest vest therapy. Healthy appetite and regular BM. Pt states she feels better except for H/A. Asked pt about placing hat in toilet but she said she is unable to clean herself up and does not want it in there. Pt stable and denied needs when writer left room.

## 2023-12-12 NOTE — PROGRESS NOTES
12/11/23 0331   NIV Type   NIV Started/Stopped Off  (pt is not ready for bipap at this time.  RT will continue to monitor.)

## 2023-12-12 NOTE — PROGRESS NOTES
Hospital Medicine Progress Note      Date of Admission: 12/4/2023  Hospital Day: 9      Chief Admission Complaint:  sob     Subjective:  resting in chair, remains on room air, bp nearly 200 today    Presenting Admission History:         60 y.o. female  with hx of dm2, obesity, MEHUL(on cpap) who presented to [facility] with sob and fatigue. Pt noted symptoms beginning last thurs with dry cough that became productive. She had assoc f/chills with temp of 101 at home. She denied myalgias/arthralgias. She also noted orthopnea(smothering feeling with laying flat) and PITTMAN for the past 1 week. No chest pain/pressure but rib pain due to coughing. She tried Avanell Liz and old abx(took on Saturday) until seeing  PCP today. In the office, PCP noted o2 in the 80s, bp was 200s/100s, ht rate in the 100s, therefore called EMS.   -had assoc fatigue  -No n/v  -she notes chronic diarrhea  -No appetite  -pt feels similar to prior pna  -she tried home inhaler w/o relief  -she uses cpap( Pressure of 18, used since 1992)  -notes weeping on legs sometimes  -she has chronic Back pain  -she is Not on oxygen at home  -she does not see a pulmonologist        Assessment/Plan:       Current Principal Problem:  Sepsis ([address])     Sepsis- with tachycardia/tachypnea, fever of 101 at home, +pulm source  -Bcx ngtd  -Iv abx started 12/4, completed course  --given Ivfs  -Trended lactate and normalized  -Ua was ordered in ER, no infection noted     Sob/hypoxia- resolved, was transient, unclear etiology, CXR noted possible mass  -CT chest done, neg for PE but noted rt sided pna  -supp o2 if needed  -Duonebs scheduled  -Mucinex  -strep pna/legionella ordered and neg  -Continued tenex  -Tessalon perles   -pulm consulted 12/6 given lack of signif improvement, apprec recs, noted bronchial asthma  -decadron po started 12/5 pm, switched to prednisone by pulm   -echo ordered(ef 55%, cannot tell wm abn)   -started dulera  - lasix restarted     Lactic

## 2023-12-12 NOTE — PLAN OF CARE
Denies pain. Bed locked and in low position, with bedside table and call light within reach, up independently in room with steady gait.   Problem: Pain  Goal: Verbalizes/displays adequate comfort level or baseline comfort level  12/12/2023 0642 by Jean Carlos Mitchell RN  Outcome: Progressing     Problem: Safety - Adult  Goal: Free from fall injury  12/12/2023 0642 by Jean Carlos Mitchell RN  Outcome: Progressing     Problem: ABCDS Injury Assessment  Goal: Absence of physical injury  Outcome: Progressing

## 2023-12-12 NOTE — PROGRESS NOTES
12/12/23 0319   NIV Type   Equipment Type v60   Mode Bilevel   Mask Type Full face mask   Mask Size Small   Assessment   Respirations 20   SpO2 97 %   Comfort Level Good   Using Accessory Muscles No   Mask Compliance Good   Skin Protection for O2 Device No  (pt does not want mepilex)   Settings/Measurements   PIP Observed 16 cm H20   IPAP 15 cmH20   CPAP/EPAP 5 cmH2O   Vt (Measured) 431 mL   Rate Ordered 18   FiO2  30 %   I Time/ I Time % 1 s   Minute Volume (L/min) 9.4 Liters   Mask Leak (lpm) 0 lpm   Patient's Home Machine No   Alarm Settings   Alarms On Y   Low Pressure (cmH2O) 6 cmH2O   High Pressure (cmH2O) 30 cmH2O   Apnea (secs) 20 secs   RR Low (bpm) 6   RR High (bpm) 40 br/min

## 2023-12-12 NOTE — CARE COORDINATION
Chart reviewed day 8. Care managed by IM. Continue to follow for improved B/P. Discussed HHC, stated she is not sure she would want that. Updated that if she gets home and thinks she would benefit from it her PCP can arrange. Will need Lyft to PCP's office at d/c to get her car. Stated she is not on any pain meds and thinks she can manage. Is currently on RA.  Augustus Umana RN

## 2023-12-12 NOTE — PROGRESS NOTES
Comprehensive Nutrition Assessment    Type and Reason for Visit:  Initial, RD Nutrition Re-Screen/LOS    Nutrition Recommendations/Plan:   Modify to CC4 LIZZY diet   Monitor nutrition adequacy, pertinent labs, bowel habits, wt changes, and clinical progress     Malnutrition Assessment:  Malnutrition Status: At risk for malnutrition (Comment) (12/12/23 2089)      Nutrition Assessment:    LOS: 59 yo F w pmh DM, HLD, HTN admitted with sepsis. On LIZZY diet with % po intakes per EMR. Pt reports having an appetite and being able to eat her meals well. Denied nutrition needs at time of visit. Recommend adding carb control d/t elevated BG. Encourage intakes as tolerated, will monitor. Nutrition Related Findings:    -326 x24 hrs. x2 BM 12/11. +1 pitting BLE edema. K 3.3. Wound Type: None       Current Nutrition Intake & Therapies:    Average Meal Intake: %  Average Supplements Intake: None Ordered  ADULT DIET; Regular; No Added Salt (3-4 gm)    Anthropometric Measures:  Height: 167.6 cm (5' 6\")  Ideal Body Weight (IBW): 130 lbs (59 kg)       Current Body Weight: 157.7 kg (347 lb 10.7 oz),   IBW.  Weight Source: Not Specified  Current BMI (kg/m2): 56.1  Usual Body Weight: 151.7 kg (334 lb 7 oz) (9/20)  % Weight Change (Calculated): 4  Weight Adjustment For: No Adjustment                 BMI Categories: Obese Class 3 (BMI 40.0 or greater)    Estimated Daily Nutrient Needs:  Energy Requirements Based On: Kcal/kg (30-40)  Weight Used for Energy Requirements: Ideal  Energy (kcal/day): 5843-5594 kcals  Weight Used for Protein Requirements: Ideal (1.2-1.5)  Protein (g/day): 70-89 g  Method Used for Fluid Requirements: 1 ml/kcal  Fluid (ml/day): 0466-0023 ml    Nutrition Diagnosis:   No nutrition diagnosis at this time    Nutrition Interventions:   Food and/or Nutrient Delivery: Modify Current Diet  Nutrition Education/Counseling: Education not indicated  Coordination of Nutrition Care: Continue to monitor while inpatient       Goals:     Goals: PO intake 75% or greater, prior to discharge       Nutrition Monitoring and Evaluation:   Behavioral-Environmental Outcomes: None Identified  Food/Nutrient Intake Outcomes: Food and Nutrient Intake  Physical Signs/Symptoms Outcomes: Biochemical Data, Meal Time Behavior, Weight    Discharge Planning:    Continue current diet     Kristin Salcedo [address] [place] [address]  Contact: Office: [phone]; [address] [place] [address] Road: [facility]

## 2023-12-13 LAB
ALBUMIN SERPL-MCNC: 3.8 G/DL (ref 3.4–5)
ANION GAP SERPL CALCULATED.3IONS-SCNC: 12 MMOL/L (ref 3–16)
BASOPHILS # BLD: 0.1 K/UL (ref 0–0.2)
BASOPHILS NFR BLD: 0.7 %
BUN SERPL-MCNC: 15 MG/DL (ref 7–20)
CALCIUM SERPL-MCNC: 8.9 MG/DL (ref 8.3–10.6)
CHLORIDE SERPL-SCNC: 99 MMOL/L (ref 99–110)
CO2 SERPL-SCNC: 28 MMOL/L (ref 21–32)
CREAT SERPL-MCNC: <0.5 MG/DL (ref 0.6–1.1)
DEPRECATED RDW RBC AUTO: 13 % (ref 12.4–15.4)
EOSINOPHIL # BLD: 0.2 K/UL (ref 0–0.6)
EOSINOPHIL NFR BLD: 1.3 %
GFR SERPLBLD CREATININE-BSD FMLA CKD-EPI: >60 ML/MIN/{1.73_M2}
GLUCOSE BLD-MCNC: 137 MG/DL (ref 70–99)
GLUCOSE BLD-MCNC: 154 MG/DL (ref 70–99)
GLUCOSE BLD-MCNC: 162 MG/DL (ref 70–99)
GLUCOSE BLD-MCNC: 200 MG/DL (ref 70–99)
GLUCOSE BLD-MCNC: 208 MG/DL (ref 70–99)
GLUCOSE SERPL-MCNC: 143 MG/DL (ref 70–99)
HCT VFR BLD AUTO: 35.4 % (ref 36–48)
HGB BLD-MCNC: 11.9 G/DL (ref 12–16)
LYMPHOCYTES # BLD: 2.5 K/UL (ref 1–5.1)
LYMPHOCYTES NFR BLD: 19.9 %
MAGNESIUM SERPL-MCNC: 2.2 MG/DL (ref 1.8–2.4)
MCH RBC QN AUTO: 30.1 PG (ref 26–34)
MCHC RBC AUTO-ENTMCNC: 33.6 G/DL (ref 31–36)
MCV RBC AUTO: 89.5 FL (ref 80–100)
MONOCYTES # BLD: 0.9 K/UL (ref 0–1.3)
MONOCYTES NFR BLD: 7.6 %
NEUTROPHILS # BLD: 8.7 K/UL (ref 1.7–7.7)
NEUTROPHILS NFR BLD: 70.5 %
PERFORMED ON: ABNORMAL
PHOSPHATE SERPL-MCNC: 4 MG/DL (ref 2.5–4.9)
PLATELET # BLD AUTO: 403 K/UL (ref 135–450)
PMV BLD AUTO: 7.3 FL (ref 5–10.5)
POTASSIUM SERPL-SCNC: 3.2 MMOL/L (ref 3.5–5.1)
RBC # BLD AUTO: 3.95 M/UL (ref 4–5.2)
SODIUM SERPL-SCNC: 139 MMOL/L (ref 136–145)
WBC # BLD AUTO: 12.4 K/UL (ref 4–11)

## 2023-12-13 PROCEDURE — 83735 ASSAY OF MAGNESIUM: CPT

## 2023-12-13 PROCEDURE — 94640 AIRWAY INHALATION TREATMENT: CPT

## 2023-12-13 PROCEDURE — 94761 N-INVAS EAR/PLS OXIMETRY MLT: CPT

## 2023-12-13 PROCEDURE — 6360000002 HC RX W HCPCS: Performed by: INTERNAL MEDICINE

## 2023-12-13 PROCEDURE — 6370000000 HC RX 637 (ALT 250 FOR IP): Performed by: INTERNAL MEDICINE

## 2023-12-13 PROCEDURE — 85025 COMPLETE CBC W/AUTO DIFF WBC: CPT

## 2023-12-13 PROCEDURE — 2580000003 HC RX 258: Performed by: INTERNAL MEDICINE

## 2023-12-13 PROCEDURE — 2700000000 HC OXYGEN THERAPY PER DAY

## 2023-12-13 PROCEDURE — 94660 CPAP INITIATION&MGMT: CPT

## 2023-12-13 PROCEDURE — 80069 RENAL FUNCTION PANEL: CPT

## 2023-12-13 PROCEDURE — 6360000002 HC RX W HCPCS: Performed by: NURSE PRACTITIONER

## 2023-12-13 PROCEDURE — 1200000000 HC SEMI PRIVATE

## 2023-12-13 PROCEDURE — 94669 MECHANICAL CHEST WALL OSCILL: CPT

## 2023-12-13 RX ORDER — AMLODIPINE BESYLATE 5 MG/1
10 TABLET ORAL DAILY
Status: DISCONTINUED | OUTPATIENT
Start: 2023-12-14 | End: 2023-12-14 | Stop reason: HOSPADM

## 2023-12-13 RX ORDER — AMLODIPINE BESYLATE 5 MG/1
5 TABLET ORAL ONCE
Status: COMPLETED | OUTPATIENT
Start: 2023-12-13 | End: 2023-12-13

## 2023-12-13 RX ORDER — IPRATROPIUM BROMIDE AND ALBUTEROL SULFATE 2.5; .5 MG/3ML; MG/3ML
1 SOLUTION RESPIRATORY (INHALATION) EVERY 4 HOURS PRN
Status: DISCONTINUED | OUTPATIENT
Start: 2023-12-13 | End: 2023-12-14 | Stop reason: HOSPADM

## 2023-12-13 RX ADMIN — IPRATROPIUM BROMIDE AND ALBUTEROL SULFATE 1 DOSE: 2.5; .5 SOLUTION RESPIRATORY (INHALATION) at 07:10

## 2023-12-13 RX ADMIN — ACETAMINOPHEN 650 MG: 325 TABLET, FILM COATED ORAL at 23:53

## 2023-12-13 RX ADMIN — PREDNISONE 30 MG: 20 TABLET ORAL at 08:42

## 2023-12-13 RX ADMIN — AMLODIPINE BESYLATE 5 MG: 5 TABLET ORAL at 08:49

## 2023-12-13 RX ADMIN — Medication 2 PUFF: at 07:10

## 2023-12-13 RX ADMIN — TELMISARTAN 80 MG: 80 TABLET ORAL at 11:43

## 2023-12-13 RX ADMIN — INSULIN GLARGINE 22 UNITS: 100 INJECTION, SOLUTION SUBCUTANEOUS at 20:27

## 2023-12-13 RX ADMIN — SODIUM CHLORIDE, PRESERVATIVE FREE 10 ML: 5 INJECTION INTRAVENOUS at 20:26

## 2023-12-13 RX ADMIN — GUANFACINE 2 MG: 1 TABLET ORAL at 22:35

## 2023-12-13 RX ADMIN — POTASSIUM BICARBONATE 40 MEQ: 782 TABLET, EFFERVESCENT ORAL at 08:41

## 2023-12-13 RX ADMIN — ENOXAPARIN SODIUM 40 MG: 100 INJECTION SUBCUTANEOUS at 08:42

## 2023-12-13 RX ADMIN — POTASSIUM BICARBONATE 40 MEQ: 782 TABLET, EFFERVESCENT ORAL at 20:19

## 2023-12-13 RX ADMIN — CYANOCOBALAMIN TAB 500 MCG 500 MCG: 500 TAB at 08:42

## 2023-12-13 RX ADMIN — FUROSEMIDE 80 MG: 80 TABLET ORAL at 08:42

## 2023-12-13 RX ADMIN — ENOXAPARIN SODIUM 40 MG: 100 INJECTION SUBCUTANEOUS at 20:27

## 2023-12-13 RX ADMIN — Medication 1 TABLET: at 08:42

## 2023-12-13 RX ADMIN — GUANFACINE 2 MG: 1 TABLET ORAL at 08:48

## 2023-12-13 RX ADMIN — INSULIN LISPRO 4 UNITS: 100 INJECTION, SOLUTION INTRAVENOUS; SUBCUTANEOUS at 17:42

## 2023-12-13 RX ADMIN — Medication 2 PUFF: at 20:00

## 2023-12-13 RX ADMIN — LABETALOL HYDROCHLORIDE 10 MG: 5 INJECTION INTRAVENOUS at 23:58

## 2023-12-13 RX ADMIN — AMLODIPINE BESYLATE 5 MG: 5 TABLET ORAL at 13:51

## 2023-12-13 RX ADMIN — HYDRALAZINE HYDROCHLORIDE 10 MG: 20 INJECTION, SOLUTION INTRAMUSCULAR; INTRAVENOUS at 00:24

## 2023-12-13 RX ADMIN — Medication 2 PUFF: at 20:04

## 2023-12-13 RX ADMIN — SERTRALINE 100 MG: 50 TABLET, FILM COATED ORAL at 20:19

## 2023-12-13 RX ADMIN — HYDRALAZINE HYDROCHLORIDE 10 MG: 20 INJECTION, SOLUTION INTRAMUSCULAR; INTRAVENOUS at 18:14

## 2023-12-13 RX ADMIN — INSULIN LISPRO 4 UNITS: 100 INJECTION, SOLUTION INTRAVENOUS; SUBCUTANEOUS at 11:43

## 2023-12-13 RX ADMIN — ACETAMINOPHEN 650 MG: 325 TABLET, FILM COATED ORAL at 08:41

## 2023-12-13 RX ADMIN — SODIUM CHLORIDE, PRESERVATIVE FREE 10 ML: 5 INJECTION INTRAVENOUS at 08:45

## 2023-12-13 ASSESSMENT — PAIN DESCRIPTION - LOCATION
LOCATION: HEAD
LOCATION: HEAD

## 2023-12-13 ASSESSMENT — PAIN SCALES - GENERAL
PAINLEVEL_OUTOF10: 8
PAINLEVEL_OUTOF10: 8

## 2023-12-13 ASSESSMENT — PAIN DESCRIPTION - DESCRIPTORS: DESCRIPTORS: ACHING

## 2023-12-13 NOTE — PROGRESS NOTES
Physician Progress Note      Loi CEDENO #:                  [id number]  :                       1965  ADMIT DATE:       2023 9:59 AM  DISCH DATE:  RESPONDING  PROVIDER #:        Boy Robledo MD          QUERY TEXT:    Patient admitted with shortness of breath. Noted documentation of sepsis in ED   provider and attending H/P. In order to support the diagnosis of sepsis,   please include additional clinical indicators in your documentation. Or   please document if the diagnosis of sepsis has been ruled out after further   study    The medical record reflects the following:  Risk Factors: obesity, DM, MEHUL, orthopnea, cough, chills and reported fever at   home  Clinical Indicators: on arrival to ED -Temp 98.2, , RR 24-27, WBC   7.4, PCT 0.11, lactic acid 4.1->4.8->5.4  Per ED provider- \"Pneumonia of right lower lobe due to infectious   organism. Jefm Agreste Jefm Agreste Severe sepsis\"  BC- NGTD, urine antigens -negative  Per H/P-\"Sepsis- with tachycardia/tachypnea, fever of 101 at home, +pulm   source\"  CT chest - RLL pneumonia  Treatment: labs, imaging, 3.5L IVF boluses followed by cont. infusion, IV   Rocephin/Azithromycin, Duonebs, O2, supportive care    Thank you,  Rosa Cooney RN BSN CRCR CCDS  [email or web address]  Options provided:  -- Sepsis present as evidenced by, Please document evidence. -- Sepsis was ruled out after study, pneumonia only confirmed  -- Other - I will add my own diagnosis  -- Disagree - Not applicable / Not valid  -- Disagree - Clinically unable to determine / Unknown  -- Refer to Clinical Documentation Reviewer    PROVIDER RESPONSE TEXT:    Sepsis was ruled out after study, pneumonia only confirmed.     Query created by: Burgess Sparks on 2023 2:44 PM      Electronically signed by:  Boy Robledo MD 2023 8:16 PM

## 2023-12-13 NOTE — PROGRESS NOTES
Pt. Resting in bed. Alert/oriented. Vitals and assessment stable as charted. BP at present 159/78. Resp easy; on room air; still does have some mild dyspnea on exertion. Denies any pain/nausea or any other discomfort at present time. Call light in reach. Will continue to monitor.

## 2023-12-13 NOTE — PROGRESS NOTES
Received patient on the chair, alert and oriented x4. Assisted back into bed. BP- 188/80 mmhg, PRN hydralazine given. Noted that IV site is already infiltrated, will put a new one later in the morning so the patient can take a rest, there is no due IV medications on MAR upon checking though. Uses BiPAP at night. Call bell within reach. Independent and calls out appropriately. On specialty bed. Maintained a calm and comfortable environment. Shift assessment updated and documented.

## 2023-12-13 NOTE — PROGRESS NOTES
12/13/23 0105   NIV Type   $NIV $Daily Charge   NIV Started/Stopped On   Equipment Type v60   Mode Bilevel   Mask Type Full face mask   Mask Size Small   Settings/Measurements   IPAP 15 cmH20   CPAP/EPAP 5 cmH2O   Vt (Measured) 510 mL   Rate Ordered 18   FiO2  30 %   Mask Leak (lpm) 2 lpm   Patient's Home Machine No   Alarm Settings   Alarms On Y   Low Pressure (cmH2O) 6 cmH2O   High Pressure (cmH2O) 30 cmH2O   Patient Observation   Observations refused mepilex on nose

## 2023-12-13 NOTE — PROGRESS NOTES
BP now 184/83. She has already received all her scheduled PO htn medications. Message sent to attending; new order for and administered an additional dose of 5 mg norvasc. Will recheck. Update @ 1526; BP now 180/74. Notified attending. See new orders. I will recheck her again and if needed replace her IV and give PRNs. @ 1816; /70; inserted new IV; gave 10 mg hydralazine IVP as per prn order. Will recheck.

## 2023-12-13 NOTE — PLAN OF CARE
Problem: Pain  Goal: Verbalizes/displays adequate comfort level or baseline comfort level  Outcome: Progressing  Flowsheets (Taken 12/8/2023 2219 by Christoper Habermann, RN)  Verbalizes/displays adequate comfort level or baseline comfort level:   Encourage patient to monitor pain and request assistance   Assess pain using appropriate pain scale   Administer analgesics based on type and severity of pain and evaluate response   Implement non-pharmacological measures as appropriate and evaluate response   Notify Licensed Independent Practitioner if interventions unsuccessful or patient reports new pain     Problem: Safety - Adult  Goal: Free from fall injury  Outcome: Progressing  Flowsheets (Taken 12/8/2023 0207 by Santos Metcalf, RN)  Free From Fall Injury:   Instruct family/caregiver on patient safety   Based on caregiver fall risk screen, instruct family/caregiver to ask for assistance with transferring infant if caregiver noted to have fall risk factors     Problem: ABCDS Injury Assessment  Goal: Absence of physical injury  Outcome: Progressing  Flowsheets (Taken 12/7/2023 0819 by Ghada Angeles RN)  Absence of Physical Injury: Implement safety measures based on patient assessment     Problem: Chronic Conditions and Co-morbidities  Goal: Patient's chronic conditions and co-morbidity symptoms are monitored and maintained or improved  Outcome: Progressing  Flowsheets (Taken 12/13/2023 0239)  Care Plan - Patient's Chronic Conditions and Co-Morbidity Symptoms are Monitored and Maintained or Improved: Monitor and assess patient's chronic conditions and comorbid symptoms for stability, deterioration, or improvement

## 2023-12-13 NOTE — PROGRESS NOTES
12/13/23 0335   NIV Type   NIV Started/Stopped On   Equipment Type v60   Mode Bilevel   Mask Type Full face mask   Mask Size Small   Assessment   Respirations 20   Settings/Measurements   IPAP 15 cmH20   CPAP/EPAP 5 cmH2O   Vt (Measured) 541 mL   Rate Ordered 18   FiO2  30 %   Mask Leak (lpm) 10 lpm   Patient's Home Machine No   Alarm Settings   Alarms On Y   Low Pressure (cmH2O) 6 cmH2O   High Pressure (cmH2O) 30 cmH2O

## 2023-12-14 VITALS
RESPIRATION RATE: 20 BRPM | HEIGHT: 66 IN | DIASTOLIC BLOOD PRESSURE: 77 MMHG | WEIGHT: 293 LBS | BODY MASS INDEX: 47.09 KG/M2 | SYSTOLIC BLOOD PRESSURE: 154 MMHG | TEMPERATURE: 98 F | HEART RATE: 79 BPM | OXYGEN SATURATION: 94 %

## 2023-12-14 LAB
GLUCOSE BLD-MCNC: 138 MG/DL (ref 70–99)
GLUCOSE BLD-MCNC: 142 MG/DL (ref 70–99)
GLUCOSE BLD-MCNC: 164 MG/DL (ref 70–99)
GLUCOSE BLD-MCNC: 182 MG/DL (ref 70–99)
PERFORMED ON: ABNORMAL

## 2023-12-14 PROCEDURE — 94640 AIRWAY INHALATION TREATMENT: CPT

## 2023-12-14 PROCEDURE — 2580000003 HC RX 258: Performed by: INTERNAL MEDICINE

## 2023-12-14 PROCEDURE — 94660 CPAP INITIATION&MGMT: CPT

## 2023-12-14 PROCEDURE — 2700000000 HC OXYGEN THERAPY PER DAY

## 2023-12-14 PROCEDURE — 6360000002 HC RX W HCPCS: Performed by: INTERNAL MEDICINE

## 2023-12-14 PROCEDURE — 94761 N-INVAS EAR/PLS OXIMETRY MLT: CPT

## 2023-12-14 PROCEDURE — 6370000000 HC RX 637 (ALT 250 FOR IP): Performed by: INTERNAL MEDICINE

## 2023-12-14 RX ORDER — AMLODIPINE BESYLATE 10 MG/1
10 TABLET ORAL DAILY
Qty: 30 TABLET | Refills: 0 | Status: SHIPPED | OUTPATIENT
Start: 2023-12-15

## 2023-12-14 RX ADMIN — Medication 2 PUFF: at 08:37

## 2023-12-14 RX ADMIN — TELMISARTAN 80 MG: 80 TABLET ORAL at 10:33

## 2023-12-14 RX ADMIN — GUANFACINE 2 MG: 1 TABLET ORAL at 10:33

## 2023-12-14 RX ADMIN — FUROSEMIDE 80 MG: 80 TABLET ORAL at 10:33

## 2023-12-14 RX ADMIN — CYANOCOBALAMIN TAB 500 MCG 500 MCG: 500 TAB at 10:32

## 2023-12-14 RX ADMIN — SODIUM CHLORIDE, PRESERVATIVE FREE 10 ML: 5 INJECTION INTRAVENOUS at 10:33

## 2023-12-14 RX ADMIN — AMLODIPINE BESYLATE 10 MG: 5 TABLET ORAL at 10:37

## 2023-12-14 RX ADMIN — ENOXAPARIN SODIUM 40 MG: 100 INJECTION SUBCUTANEOUS at 10:32

## 2023-12-14 RX ADMIN — POTASSIUM BICARBONATE 40 MEQ: 782 TABLET, EFFERVESCENT ORAL at 10:33

## 2023-12-14 RX ADMIN — Medication 1 TABLET: at 10:32

## 2023-12-14 NOTE — PROGRESS NOTES
Occupational Therapy  OT follow up attempted, pt independently moving around room packing up belongings for discharge. Prior to writer's entrance pt had donned shoes and jeans, pt reports no need for OT following education on OT services.  Will d/c acute OT at this time per pt request.  PORTER Carr/HECTOR

## 2023-12-14 NOTE — PLAN OF CARE
Problem: Pain  Goal: Verbalizes/displays adequate comfort level or baseline comfort level  Outcome: Progressing  Flowsheets (Taken 12/8/2023 2219 by Jessica Frye, RN)  Verbalizes/displays adequate comfort level or baseline comfort level:   Encourage patient to monitor pain and request assistance   Assess pain using appropriate pain scale   Administer analgesics based on type and severity of pain and evaluate response   Implement non-pharmacological measures as appropriate and evaluate response   Notify Licensed Independent Practitioner if interventions unsuccessful or patient reports new pain     Problem: Safety - Adult  Goal: Free from fall injury  Outcome: Progressing  Flowsheets (Taken 12/8/2023 0207 by Aydin Martínez, RN)  Free From Fall Injury:   Instruct family/caregiver on patient safety   Based on caregiver fall risk screen, instruct family/caregiver to ask for assistance with transferring infant if caregiver noted to have fall risk factors     Problem: ABCDS Injury Assessment  Goal: Absence of physical injury  Outcome: Progressing  Flowsheets (Taken 12/7/2023 0819 by Judah Rushing, REE)  Absence of Physical Injury: Implement safety measures based on patient assessment     Problem: Chronic Conditions and Co-morbidities  Goal: Patient's chronic conditions and co-morbidity symptoms are monitored and maintained or improved  Outcome: Progressing  Flowsheets (Taken 12/13/2023 0239)  Care Plan - Patient's Chronic Conditions and Co-Morbidity Symptoms are Monitored and Maintained or Improved: Monitor and assess patient's chronic conditions and comorbid symptoms for stability, deterioration, or improvement

## 2023-12-14 NOTE — PROGRESS NOTES
12/14/23 0012   NIV Type   $NIV $Daily Charge   NIV Started/Stopped On   Equipment Type v60   Mode Bilevel   Mask Type Full face mask   Mask Size Small   Assessment   Respirations 21   SpO2 96 %   Comfort Level Good   Using Accessory Muscles No   Mask Compliance Good   Skin Assessment Clean, dry, & intact   Settings/Measurements   PIP Observed 17 cm H20   IPAP 15 cmH20   CPAP/EPAP 5 cmH2O   Vt (Measured) 667 mL   Rate Ordered 18   FiO2  30 %   I Time/ I Time % 1 s   Minute Volume (L/min) 16.2 Liters   Mask Leak (lpm) 2 lpm   Patient's Home Machine No   Alarm Settings   Alarms On Y   Low Pressure (cmH2O) 6 cmH2O   High Pressure (cmH2O) 30 cmH2O   RR Low (bpm) 6   RR High (bpm) 40 br/min

## 2023-12-14 NOTE — PROGRESS NOTES
Received patient on the chair, alert and oriented x4. BP- 164/80 mmhg. Uses BiPAP at night. On accucheck and telemetry monitoring. Call bell within reach. Independent and calls out appropriately. On specialty bed. Maintained a calm and comfortable environment. Shift assessment updated and documented.

## 2023-12-14 NOTE — CARE COORDINATION
Chart reviewed day 10. Care managed by IM. Continuing to follow B/P and H/A. From home alone. Declining HHC. Will need lyft to MD's office for car. Minimal support. Maddi Shelton RN      D/c order noted. Spoke with patient will be going to [employer]. Elvia requested for 530pm. Nurse will have instructions completed by then.  to call RN when ride is booke to notify of car and plates.  Maddi Shelton RN

## 2023-12-14 NOTE — PROGRESS NOTES
12/14/23 0334   NIV Type   NIV Started/Stopped On   Equipment Type v60   Mode Bilevel   Mask Type Full face mask   Mask Size Small   Assessment   Respirations 20   SpO2 96 %   Comfort Level Good   Using Accessory Muscles No   Mask Compliance Good   Settings/Measurements   PIP Observed 15 cm H20   IPAP 15 cmH20   CPAP/EPAP 5 cmH2O   Vt (Measured) 282 mL   Rate Ordered 18   FiO2  30 %   I Time/ I Time % 1 s   Minute Volume (L/min) 7.2 Liters   Mask Leak (lpm) 1 lpm   Patient's Home Machine No   Alarm Settings   Alarms On Y   Low Pressure (cmH2O) 6 cmH2O   High Pressure (cmH2O) 30 cmH2O   RR Low (bpm) 6   RR High (bpm) 40 br/min

## 2024-01-25 ENCOUNTER — OFFICE VISIT (OUTPATIENT)
Dept: PULMONOLOGY | Age: 59
End: 2024-01-25
Payer: COMMERCIAL

## 2024-01-25 VITALS
BODY MASS INDEX: 47.09 KG/M2 | DIASTOLIC BLOOD PRESSURE: 84 MMHG | OXYGEN SATURATION: 93 % | TEMPERATURE: 97.5 F | HEART RATE: 89 BPM | WEIGHT: 293 LBS | SYSTOLIC BLOOD PRESSURE: 152 MMHG | RESPIRATION RATE: 20 BRPM | HEIGHT: 66 IN

## 2024-01-25 DIAGNOSIS — K76.0 HEPATIC STEATOSIS: ICD-10-CM

## 2024-01-25 DIAGNOSIS — G47.33 OSA (OBSTRUCTIVE SLEEP APNEA): ICD-10-CM

## 2024-01-25 DIAGNOSIS — E11.65 UNCONTROLLED TYPE 2 DIABETES MELLITUS WITH HYPERGLYCEMIA (HCC): ICD-10-CM

## 2024-01-25 DIAGNOSIS — R91.8 PULMONARY INFILTRATE: Primary | ICD-10-CM

## 2024-01-25 DIAGNOSIS — E66.01 MORBID OBESITY WITH BMI OF 50.0-59.9, ADULT (HCC): ICD-10-CM

## 2024-01-25 DIAGNOSIS — J45.41 MODERATE PERSISTENT ASTHMA WITH ACUTE EXACERBATION IN ADULT: ICD-10-CM

## 2024-01-25 PROCEDURE — 99215 OFFICE O/P EST HI 40 MIN: CPT | Performed by: INTERNAL MEDICINE

## 2024-01-25 PROCEDURE — 94664 DEMO&/EVAL PT USE INHALER: CPT | Performed by: INTERNAL MEDICINE

## 2024-01-25 RX ORDER — MONTELUKAST SODIUM 10 MG/1
10 TABLET ORAL DAILY
Qty: 30 TABLET | Refills: 3 | Status: SHIPPED | OUTPATIENT
Start: 2024-01-25

## 2024-01-25 ASSESSMENT — SLEEP AND FATIGUE QUESTIONNAIRES
HOW LIKELY ARE YOU TO NOD OFF OR FALL ASLEEP WHILE SITTING INACTIVE IN A PUBLIC PLACE: 0
HOW LIKELY ARE YOU TO NOD OFF OR FALL ASLEEP WHILE WATCHING TV: 3
HOW LIKELY ARE YOU TO NOD OFF OR FALL ASLEEP WHEN YOU ARE A PASSENGER IN A CAR FOR AN HOUR WITHOUT A BREAK: 0
HOW LIKELY ARE YOU TO NOD OFF OR FALL ASLEEP WHILE SITTING AND READING: 3
HOW LIKELY ARE YOU TO NOD OFF OR FALL ASLEEP WHILE LYING DOWN TO REST IN THE AFTERNOON WHEN CIRCUMSTANCES PERMIT: 3
NECK CIRCUMFERENCE (INCHES): 20
HOW LIKELY ARE YOU TO NOD OFF OR FALL ASLEEP WHILE SITTING AND TALKING TO SOMEONE: 0
HOW LIKELY ARE YOU TO NOD OFF OR FALL ASLEEP WHILE SITTING QUIETLY AFTER LUNCH WITHOUT ALCOHOL: 0
ESS TOTAL SCORE: 9
HOW LIKELY ARE YOU TO NOD OFF OR FALL ASLEEP IN A CAR, WHILE STOPPED FOR A FEW MINUTES IN TRAFFIC: 0

## 2024-01-25 NOTE — PROGRESS NOTES
MA Communication:  The following orders are received by verbal communication from Orlando Cervantes MD    Orders include:    Sleep study with PAP titration  PFT  LDCT  Follow up in 1 month    
UNITS CAPS, Take by mouth, Disp: , Rfl:     Multiple Vitamins-Minerals (THERAPEUTIC MULTIVITAMIN-MINERALS) tablet, Take 1 tablet by mouth daily Alive women's energy, Disp: , Rfl:     metFORMIN (GLUCOPHAGE) 500 MG tablet, Take 2 tablets by mouth 2 times daily (with meals), Disp: , Rfl:     guanFACINE (TENEX) 1 MG tablet, Take 2 tablets by mouth 2 times daily, Disp: , Rfl:     albuterol (PROVENTIL HFA;VENTOLIN HFA) 108 (90 BASE) MCG/ACT inhaler, Inhale 2 puffs into the lungs every 6 hours as needed for Wheezing, Disp: , Rfl:     Fish Oil-Cholecalciferol (FISH OIL + D3) 5711-6701 MG-UNIT CAPS, Take by mouth See Admin Instructions 1 tab in am, 2 tab at night, Disp: , Rfl:     furosemide (LASIX) 80 MG tablet, Take 1 tablet by mouth daily, Disp: , Rfl:     sertraline (ZOLOFT) 50 MG tablet, Take 2 tablets by mouth daily, Disp: , Rfl:        Test Results:  Radiology:  I have reviewed radiology images personally.    CT ABDOMEN PELVIS W IV CONTRAST Additional Contrast? Oral     Result Date: 12/6/2023  1. Diffuse fatty liver but no focal disease. 2. No evidence of bowel obstruction, perforation or thickening.  No evidence of pneumatosis. 3. No evidence of aortic or mesenteric or renal occlusion. 4. No evidence of intra-abdominal or pelvic abscess.      CTA chest December 4, 2023  No PE  Masslike consolidation in the right lower lobe              PFTs/Oxygen testing:  NA      Sleep:  NA      Cardiology   Echo  N/A        Labs:   Hemoglobin (g/dL)   Date Value   12/13/2023 11.9 (L)     Eosinophils Absolute (K/uL)   Date Value   12/13/2023 0.2     Platelets (K/uL)   Date Value   12/13/2023 403     Creatinine (mg/dL)   Date Value   12/13/2023 <0.5 (L)     Pro-BNP (pg/mL)   Date Value   12/04/2023 169 (H)          ________________________________________________________________________________________________________________________________________________________  Vital Signs  Vitals:    01/25/24 0907   BP: (!) 152/84   Pulse: 89

## 2024-01-25 NOTE — PATIENT INSTRUCTIONS
Continue Dulera 100 mcg with a spacer.  Wash mouth after use  Inhaler education provided.  Continue albuterol as needed  Will start Singulair 10 mg p.o. daily.  Educated patient about side effects and FDA warning  If symptoms not controlled by next visit will increase Dulera to 200 mcg.  Pulmonary function test before next visit  Low-dose CT scan to follow-up on lung infiltrates/masslike consolidation before next visit.  Will arrange for in lab titration study due to difficulties with her old CPAP machine to see if she qualifies for a new order or an upgrade to BiPAP.  Patient is already on CPAP pressure of 18.  Had the device for more than 18 years and is less effective currently  Recommended to discuss other options for weight loss medications with PCP  Follow-up in 4 to 6 weeks    Health Maintenance/Preventive measures:        >>  Avoid exposure to tobacco, irritants, allergens as possible as well as contact with patients with infectious respiratory illness.        >>  Stay up-to-date with influenza & pneumonia vaccines, RSV, & COVID-19 vaccine as recommended by the Advisory Committee on Immunization Practices (ACIP)        >>  Healthy diet and activity as able.        >>  Acid reflux precautions: Head of bed elevation, avoiding tight clothes, avoiding big meals or snacking 3 hours before bedtime, targeting healthy weight.        >>  Practice sleep hygiene measures. Avoid driving or operating heavy machines if tired or sleepy    [facility] can be reached at [phone] if you have any questions or to schedule your sleep study.

## 2024-02-08 ENCOUNTER — HOSPITAL ENCOUNTER (OUTPATIENT)
Dept: CT IMAGING | Age: 59
Discharge: HOME | End: 2024-02-08
Payer: COMMERCIAL

## 2024-02-08 ENCOUNTER — HOSPITAL ENCOUNTER (OUTPATIENT)
Dept: PULMONOLOGY | Age: 59
Discharge: HOME | End: 2024-02-08
Payer: COMMERCIAL

## 2024-02-08 VITALS — OXYGEN SATURATION: 93 %

## 2024-02-08 DIAGNOSIS — J45.41 MODERATE PERSISTENT ASTHMA WITH ACUTE EXACERBATION IN ADULT: ICD-10-CM

## 2024-02-08 DIAGNOSIS — R91.8 PULMONARY INFILTRATE: ICD-10-CM

## 2024-02-08 LAB
DLCO %PRED: 87 %
DLCO PRED: NORMAL
DLCO/VA %PRED: NORMAL
DLCO/VA PRED: NORMAL
DLCO/VA: NORMAL
DLCO: NORMAL
EXPIRATORY TIME-POST: NORMAL
EXPIRATORY TIME: NORMAL
FEF 25-75 %CHNG: NORMAL
FEF 25-75 POST %PRED: NORMAL
FEF 25-75% %PRED-PRE: NORMAL
FEF 25-75% PRED: NORMAL
FEF 25-75-POST: NORMAL
FEF 25-75-PRE: NORMAL
FEV1 %PRED-POST: 50 %
FEV1 %PRED-PRE: 50 %
FEV1 PRED: NORMAL
FEV1-POST: NORMAL
FEV1-PRE: NORMAL
FEV1/FVC %PRED-POST: 94 %
FEV1/FVC %PRED-PRE: 91 %
FEV1/FVC PRED: NORMAL
FEV1/FVC-POST: NORMAL
FEV1/FVC-PRE: NORMAL
FVC %PRED-POST: 52 L
FVC %PRED-PRE: 55 %
FVC PRED: NORMAL
FVC-POST: NORMAL
FVC-PRE: NORMAL
GAW %PRED: NORMAL
GAW PRED: NORMAL
GAW: NORMAL
IC PRE %PRED: NORMAL
IC PRED: NORMAL
IC: NORMAL
MEP: NORMAL
MIP: NORMAL
MVV %PRED-PRE: NORMAL
MVV PRED: NORMAL
MVV-PRE: NORMAL
PEF %PRED-POST: NORMAL
PEF %PRED-PRE: NORMAL
PEF PRED: NORMAL
PEF%CHNG: NORMAL
PEF-POST: NORMAL
PEF-PRE: NORMAL
RAW %PRED: NORMAL
RAW PRED: NORMAL
RAW: NORMAL
RV PRE %PRED: NORMAL
RV PRED: NORMAL
RV: NORMAL
SVC %PRED: NORMAL
SVC PRED: NORMAL
SVC: NORMAL
TLC PRE %PRED: 80 %
TLC PRED: NORMAL
TLC: NORMAL
VA %PRED: NORMAL
VA PRED: NORMAL
VA: NORMAL
VTG %PRED: NORMAL
VTG PRED: NORMAL
VTG: NORMAL

## 2024-02-08 PROCEDURE — 94726 PLETHYSMOGRAPHY LUNG VOLUMES: CPT

## 2024-02-08 PROCEDURE — 6370000000 HC RX 637 (ALT 250 FOR IP): Performed by: INTERNAL MEDICINE

## 2024-02-08 PROCEDURE — 94060 EVALUATION OF WHEEZING: CPT

## 2024-02-08 PROCEDURE — 71250 CT THORAX DX C-: CPT

## 2024-02-08 PROCEDURE — 94760 N-INVAS EAR/PLS OXIMETRY 1: CPT

## 2024-02-08 PROCEDURE — 94729 DIFFUSING CAPACITY: CPT

## 2024-02-08 RX ORDER — ALBUTEROL SULFATE 90 UG/1
4 AEROSOL, METERED RESPIRATORY (INHALATION) ONCE
Status: COMPLETED | OUTPATIENT
Start: 2024-02-08 | End: 2024-02-08

## 2024-02-08 RX ADMIN — Medication 4 PUFF: at 09:18

## 2024-02-08 ASSESSMENT — PULMONARY FUNCTION TESTS
FEV1_PERCENT_PREDICTED_POST: 50
FVC_PERCENT_PREDICTED_PRE: 55
FEV1/FVC_PERCENT_PREDICTED_POST: 94
FEV1_PERCENT_PREDICTED_PRE: 50
FVC_PERCENT_PREDICTED_POST: 52
FEV1/FVC_PERCENT_PREDICTED_PRE: 91

## 2024-02-09 NOTE — PROCEDURES
[address] [address]                               PULMONARY FUNCTION    PATIENT NAME: LIN SHERIDAN                      :        1965  MED REC NO:   [id number]                          ROOM:  ACCOUNT NO:   [id number]                           ADMIT DATE: 2024  PROVIDER:     Micheline Darby MD    DATE OF PROCEDURE:  2024    PFT INTERPRETATION    The patient is a 58-year-old female who underwent a PFT for moderate  persistent asthma.  Spirometry shows FVC to be 55%, FEV1 to be 50%, FEV1  to FVC ratio was 91%, XXA35-02% was 37%.  The patient did not have any  significant postbronchodilator improvement.  The patient has normal  total lung capacity.  The patient has air trapping.  The patient also  has decrease in ERV.  The patient's diffusion capacity when adjusted for  volume was increased.  Flow-volume was normal.  The patient on the basis  of this PFT has moderate to severe obstructive airway disease along with  that the patient also has some changes in the PFT parameters because of  body habitus.  Please correlate clinically.        MICHELINE DARBY MD    D: 2024 19:03:41       T: 2024 19:05:50     [email or web address]  Job#: [id number]     Doc#: [id number]    CC:

## 2024-02-25 ENCOUNTER — HOSPITAL ENCOUNTER (OUTPATIENT)
Dept: SLEEP CENTER | Age: 59
Discharge: HOME | End: 2024-02-27
Payer: COMMERCIAL

## 2024-02-25 PROCEDURE — 95810 POLYSOM 6/> YRS 4/> PARAM: CPT

## 2024-02-29 DIAGNOSIS — G47.33 OSA (OBSTRUCTIVE SLEEP APNEA): Primary | ICD-10-CM

## 2024-03-05 ENCOUNTER — TELEPHONE (OUTPATIENT)
Dept: PULMONOLOGY | Age: 59
End: 2024-03-05

## 2024-03-06 DIAGNOSIS — G47.33 OSA (OBSTRUCTIVE SLEEP APNEA): Primary | ICD-10-CM

## 2024-03-13 NOTE — PROGRESS NOTES
PULMONARY CLINIC NOTE      Marcelina Drummond   : 1965  MRN: [id number]     Date of Service: 2024    PCP: Alexander Dumont MD    Referring provider: No ref. provider found      Chief Complaint   Patient presents with    Results     Patient is here to discuss sleep study, PFT and CT results           ASSESSMENT & PLAN       58 y.o. pleasant  female patient with:    1. MEHUL (obstructive sleep apnea)    2. Obesity, Class III, BMI 40-49.9 (morbid obesity) ([facility])           # Bronchial asthma, moderate persistent with exacerbations  # Long COVID  # Admission and early 2023 for hypoxic respiratory failure, community-acquired pneumonia, asthma exacerbation, masslike consolidation on CT chest  PFTs reviewed with the patient  Continue Dulera inhaler with mouth washing after use.  Discontinue Singulair due to severe anxiety  Will give Spiriva sample 1.25 mcg to try with the Dulera between now and next visit.  If that helps might switch to 3 medication inhaler if insurance covers.  Will discuss pulmonary rehab referral next visit.  Follow-up in 6 weeks    # Masslike consolidation in the right midlung  Reviewed the CAT scan findings with the patient and reassured her.  No need for follow-up scans.    # Metabolic syndrome: Diabetes, dyslipidemia, hepatic steatosis, body habitus/obesity class III  Body mass index is 57.43 kg/m².  Targeting healthy weight is encouraged. Healthy weight can help treat sleep apnea, decrease breathing difficulties and respiratory illness exacerbations.    # MEHUL/OHS, severe with severe prolonged nocturnal hypoxia  # Work shift sleep disorder.  Struggled with CPAP therapy with a pressure of 18  In lab sleep study was completed on 2024 that showed severe MEHUL with AHI of 140 with no REM sleep..  Ocular movement with decreased sleep efficiency with hypoxemia independent of respiratory events.  Hypoxemia time 136 minutes.  This was followed by in lab titration study on

## 2024-03-31 ENCOUNTER — HOSPITAL ENCOUNTER (OUTPATIENT)
Dept: SLEEP CENTER | Age: 59
Discharge: HOME | End: 2024-04-02
Payer: COMMERCIAL

## 2024-03-31 PROCEDURE — 95811 POLYSOM 6/>YRS CPAP 4/> PARM: CPT

## 2024-04-03 PROCEDURE — 95811 POLYSOM 6/>YRS CPAP 4/> PARM: CPT | Performed by: INTERNAL MEDICINE

## 2024-04-05 ENCOUNTER — OFFICE VISIT (OUTPATIENT)
Dept: PULMONOLOGY | Age: 59
End: 2024-04-05
Payer: COMMERCIAL

## 2024-04-05 VITALS
RESPIRATION RATE: 20 BRPM | WEIGHT: 293 LBS | DIASTOLIC BLOOD PRESSURE: 86 MMHG | TEMPERATURE: 97.8 F | HEART RATE: 89 BPM | BODY MASS INDEX: 47.09 KG/M2 | OXYGEN SATURATION: 94 % | HEIGHT: 66 IN | SYSTOLIC BLOOD PRESSURE: 159 MMHG

## 2024-04-05 DIAGNOSIS — G47.33 OSA (OBSTRUCTIVE SLEEP APNEA): Primary | ICD-10-CM

## 2024-04-05 DIAGNOSIS — E66.01 OBESITY, CLASS III, BMI 40-49.9 (MORBID OBESITY) (HCC): ICD-10-CM

## 2024-04-05 PROCEDURE — 99214 OFFICE O/P EST MOD 30 MIN: CPT | Performed by: INTERNAL MEDICINE

## 2024-04-05 RX ORDER — TIOTROPIUM BROMIDE INHALATION SPRAY 1.56 UG/1
2 SPRAY, METERED RESPIRATORY (INHALATION) DAILY
Qty: 2 EACH | Refills: 0 | Status: SHIPPED | COMMUNITY
Start: 2024-04-05 | End: 2024-06-04

## 2024-04-05 NOTE — PATIENT INSTRUCTIONS
Reviewed the CAT scan findings with the patient and reassured her.  No need for follow-up scans.  PFTs reviewed with the patient  Continue Dulera inhaler with mouth washing after use.  Discontinue Singulair due to severe anxiety  Will give Spiriva sample 1.25 mcg to try with the Dulera between now and next visit.  If that helps might switch to 3 medication inhaler if insurance covers.  In lab sleep study and titration study results reviewed.  Will order BiPAP device as recommended by the titration with pressures 24/21 cm water  Will refer to [facility] weight loss program.  Patient failed Ozempic and had issues with insurance coverage for weight loss options  Will discuss pulmonary rehab referral next visit.  Follow-up in 6 weeks      Health Maintenance/Preventive measures:        >>  Avoid exposure to tobacco, irritants, allergens as possible as well as contact with patients with infectious respiratory illness.        >>  Stay up-to-date with influenza & pneumonia vaccines, RSV, & COVID-19 vaccine as recommended by the Advisory Committee on Immunization Practices (ACIP)        >>  Healthy diet and activity as able.        >>  Acid reflux precautions: Head of bed elevation, avoiding tight clothes, avoiding big meals or snacking 3 hours before bedtime, targeting healthy weight.        >>  Practice sleep hygiene measures. Avoid driving or operating heavy machines if tired or sleepy.     Remember to bring a list of pulmonary medications and any CPAP or BiPAP machines to your next appointment with the office.     Please keep all of your future appointments scheduled by [facility] office. Out of respect for other patients and providers, you may be asked to reschedule your appointment if you arrive later than your scheduled appointment time. Appointments cancelled less than 24hrs in advance will be considered a no show. Patients with three missed appointments within 1 year or four missed

## 2024-04-05 NOTE — PROGRESS NOTES
MA Communication:  The following orders are received by verbal communication from Orlando Cervantes MD    Orders include:    Patient to call back with DME  6 week follow up     Ears: no ear pain and no hearing problems.Nose: no nasal congestion and no nasal drainage.Mouth/Throat: no dysphagia, no hoarseness and no throat pain.Neck: no lumps, no pain, no stiffness and no swollen glands.

## 2024-04-22 ENCOUNTER — TELEPHONE (OUTPATIENT)
Dept: PULMONOLOGY | Age: 59
End: 2024-04-22

## 2024-04-22 NOTE — TELEPHONE ENCOUNTER
Patient call and state want to change DME, want her order to be sent to [facility].    Bipap order sent to new DME

## 2024-05-03 RX ORDER — TIOTROPIUM BROMIDE INHALATION SPRAY 1.56 UG/1
2 SPRAY, METERED RESPIRATORY (INHALATION) DAILY
Qty: 1 EACH | Refills: 5 | Status: SHIPPED | OUTPATIENT
Start: 2024-05-03 | End: 2024-07-02

## 2024-07-09 ASSESSMENT — SLEEP AND FATIGUE QUESTIONNAIRES
HOW LIKELY ARE YOU TO NOD OFF OR FALL ASLEEP IN A CAR, WHILE STOPPED FOR A FEW MINUTES IN TRAFFIC: WOULD NEVER DOZE
HOW LIKELY ARE YOU TO NOD OFF OR FALL ASLEEP WHILE LYING DOWN TO REST IN THE AFTERNOON WHEN CIRCUMSTANCES PERMIT: SLIGHT CHANCE OF DOZING
HOW LIKELY ARE YOU TO NOD OFF OR FALL ASLEEP WHEN YOU ARE A PASSENGER IN A CAR FOR AN HOUR WITHOUT A BREAK: WOULD NEVER DOZE
HOW LIKELY ARE YOU TO NOD OFF OR FALL ASLEEP WHILE WATCHING TV: SLIGHT CHANCE OF DOZING
HOW LIKELY ARE YOU TO NOD OFF OR FALL ASLEEP WHILE LYING DOWN TO REST IN THE AFTERNOON WHEN CIRCUMSTANCES PERMIT: SLIGHT CHANCE OF DOZING
HOW LIKELY ARE YOU TO NOD OFF OR FALL ASLEEP WHILE SITTING QUIETLY AFTER LUNCH WITHOUT ALCOHOL: SLIGHT CHANCE OF DOZING
HOW LIKELY ARE YOU TO NOD OFF OR FALL ASLEEP WHILE SITTING AND READING: SLIGHT CHANCE OF DOZING
HOW LIKELY ARE YOU TO NOD OFF OR FALL ASLEEP WHILE SITTING QUIETLY AFTER LUNCH WITHOUT ALCOHOL: SLIGHT CHANCE OF DOZING
HOW LIKELY ARE YOU TO NOD OFF OR FALL ASLEEP IN A CAR, WHILE STOPPED FOR A FEW MINUTES IN TRAFFIC: WOULD NEVER DOZE
HOW LIKELY ARE YOU TO NOD OFF OR FALL ASLEEP WHILE SITTING INACTIVE IN A PUBLIC PLACE: WOULD NEVER DOZE
HOW LIKELY ARE YOU TO NOD OFF OR FALL ASLEEP WHILE SITTING AND READING: SLIGHT CHANCE OF DOZING
HOW LIKELY ARE YOU TO NOD OFF OR FALL ASLEEP WHILE WATCHING TV: SLIGHT CHANCE OF DOZING
HOW LIKELY ARE YOU TO NOD OFF OR FALL ASLEEP WHILE SITTING INACTIVE IN A PUBLIC PLACE: WOULD NEVER DOZE
HOW LIKELY ARE YOU TO NOD OFF OR FALL ASLEEP WHILE SITTING AND TALKING TO SOMEONE: WOULD NEVER DOZE
HOW LIKELY ARE YOU TO NOD OFF OR FALL ASLEEP WHEN YOU ARE A PASSENGER IN A CAR FOR AN HOUR WITHOUT A BREAK: WOULD NEVER DOZE
ESS TOTAL SCORE: 4
HOW LIKELY ARE YOU TO NOD OFF OR FALL ASLEEP WHILE SITTING AND TALKING TO SOMEONE: WOULD NEVER DOZE

## 2024-07-10 ENCOUNTER — OFFICE VISIT (OUTPATIENT)
Dept: PULMONOLOGY | Age: 59
End: 2024-07-10
Payer: COMMERCIAL

## 2024-07-10 VITALS
RESPIRATION RATE: 20 BRPM | SYSTOLIC BLOOD PRESSURE: 174 MMHG | OXYGEN SATURATION: 92 % | WEIGHT: 293 LBS | HEART RATE: 91 BPM | BODY MASS INDEX: 47.09 KG/M2 | HEIGHT: 66 IN | DIASTOLIC BLOOD PRESSURE: 86 MMHG | TEMPERATURE: 97.5 F

## 2024-07-10 DIAGNOSIS — E66.01 MORBID OBESITY WITH BMI OF 50.0-59.9, ADULT (HCC): ICD-10-CM

## 2024-07-10 DIAGNOSIS — G47.33 OSA (OBSTRUCTIVE SLEEP APNEA): ICD-10-CM

## 2024-07-10 DIAGNOSIS — J45.40 MODERATE PERSISTENT ASTHMA WITHOUT COMPLICATION: Primary | ICD-10-CM

## 2024-07-10 PROCEDURE — 94664 DEMO&/EVAL PT USE INHALER: CPT | Performed by: INTERNAL MEDICINE

## 2024-07-10 PROCEDURE — 99215 OFFICE O/P EST HI 40 MIN: CPT | Performed by: INTERNAL MEDICINE

## 2024-07-10 RX ORDER — ALBUTEROL SULFATE 90 UG/1
2 AEROSOL, METERED RESPIRATORY (INHALATION) EVERY 6 HOURS PRN
Qty: 18 G | Refills: 3 | Status: SHIPPED | OUTPATIENT
Start: 2024-07-10

## 2024-07-10 NOTE — PATIENT INSTRUCTIONS
Congratulated the patient on her BiPAP compliance.  Continue with the current settings, fullface mask.  Encouraged efforts for weight loss.  Patient is awaiting for second weight loss medication authorization by insurance.  Could not tolerate Ozempic.  Continue Dulera 100 mcg with a spacer and mouth washing after use.  Inhaler education provided.  Continue Spiriva Respimat.  Will order ProAir as needed.  Patient is off Singulair due to anxiety and also helped her have less headaches to come off.  Alpha-1 antitrypsin test before next visit  Follow-up in 3 months      Health Maintenance/Preventive measures:        >>  Avoid exposure to tobacco, irritants, allergens as possible as well as contact with patients with infectious respiratory illness.        >>  Stay up-to-date with influenza & pneumonia vaccines, RSV, & COVID-19 vaccine as recommended by the Advisory Committee on Immunization Practices (ACIP)        >>  Healthy diet and activity as able.        >>  Acid reflux precautions: Head of bed elevation, avoiding tight clothes, avoiding big meals or snacking 3 hours before bedtime, targeting healthy weight.        >>  Practice sleep hygiene measures. Avoid driving or operating heavy machines if tired or sleepy.

## 2024-07-10 NOTE — PROGRESS NOTES
MA Communication:  The following orders are received by verbal communication from Orlando Cervantes MD    Orders include:    Alpha 1 before next visit  Follow up 3 months    
restrictive process from bronchial asthma and body habitus with air trapping and normal gas diffusion.  FEV1 50% with no BD response        Sleep:  In lab sleep study was completed on February 25, 2024 that showed severe MEHUL with AHI of 140 with no REM sleep..  Ocular movement with decreased sleep efficiency with hypoxemia independent of respiratory events.  Hypoxemia time 136 minutes.  This was followed by in lab titration study on March 31, 2024 that qualify the patient for BiPAP 25/21      Cardiology   Echo  N/A        Labs:   Hemoglobin (g/dL)   Date Value   12/13/2023 11.9 (L)     Eosinophils Absolute (K/uL)   Date Value   12/13/2023 0.2     Platelets (K/uL)   Date Value   12/13/2023 403     Creatinine (mg/dL)   Date Value   12/13/2023 <0.5 (L)     Pro-BNP (pg/mL)   Date Value   12/04/2023 169 (H)          ________________________________________________________________________________________________________________________________________________________  Vital Signs  Vitals:    07/10/24 0920   BP: (!) 174/86   Pulse: 91   Resp: 20   Temp: 97.5 °F (36.4 °C)   SpO2: 92%               7/9/2024     6:14 PM 1/25/2024     9:10 AM   Sleep Medicine   Sitting and reading 1 3   Watching TV 1 3   Sitting, inactive in a public place (e.g. a theatre or a meeting) 0 0   As a passenger in a car for an hour without a break 0 0   Lying down to rest in the afternoon when circumstances permit 1 3   Sitting and talking to someone 0 0   Sitting quietly after a lunch without alcohol 1 0   In a car, while stopped for a few minutes in traffic 0 0   [place] Sleepiness Score 4 9   Neck (Inches)  20       Physical Exam:  General appearance: In no apparent distress.  Tachypneic with minimal activity  HEENT: Moist mucus membranes.  Crowded oropharynx  Cardiac: Normal S1 and S2.  Lungs: Decreased air entry bilaterally  Abdomen: Soft.  Increased abdominal girth  Back & Extremities: Symmetric pulses with good perfusion.  Pitting lower

## 2024-10-30 RX ORDER — TIOTROPIUM BROMIDE INHALATION SPRAY 1.56 UG/1
SPRAY, METERED RESPIRATORY (INHALATION)
Qty: 4 G | Refills: 2 | Status: SHIPPED | OUTPATIENT
Start: 2024-10-30

## 2025-02-19 ENCOUNTER — TELEPHONE (OUTPATIENT)
Dept: PULMONOLOGY | Age: 60
End: 2025-02-19

## 2025-02-19 RX ORDER — TIOTROPIUM BROMIDE INHALATION SPRAY 1.56 UG/1
2 SPRAY, METERED RESPIRATORY (INHALATION) DAILY
Qty: 4 G | Refills: 2 | Status: CANCELLED | OUTPATIENT
Start: 2025-02-19

## 2025-02-19 NOTE — TELEPHONE ENCOUNTER
Pharmacy request for Spiriva respimat refill.   Last seen: 7/10/24.   Has cancelled multiple follow up appts. None currently scheduled.   Last ordered: 10/30/24 w/ 2 refills.   Order pended if appropriate.

## 2025-03-13 PROBLEM — J45.40 ASTHMA, MODERATE PERSISTENT: Status: ACTIVE | Noted: 2025-03-13

## 2025-03-13 PROBLEM — E11.42 DIABETIC PERIPHERAL NEUROPATHY (HCC): Status: ACTIVE | Noted: 2022-06-13

## 2025-03-13 PROBLEM — M54.50 CHRONIC BILATERAL LOW BACK PAIN WITHOUT SCIATICA: Status: ACTIVE | Noted: 2022-03-08

## 2025-03-13 PROBLEM — G89.29 CHRONIC BILATERAL LOW BACK PAIN WITHOUT SCIATICA: Status: ACTIVE | Noted: 2022-03-08

## 2025-03-13 RX ORDER — POTASSIUM CHLORIDE 750 MG/1
TABLET, EXTENDED RELEASE ORAL
COMMUNITY
Start: 2025-02-05

## 2025-03-13 NOTE — PROGRESS NOTES
Chief Complaint   Patient presents with    Follow-up    Asthma    Sleep Apnea    Medication Refill     Marcelina Drummond comes in today for follow-up of sleep apnea and her breathing. She has significant medical history of HTN, HLD, DM, and asthma.  She is a non-smoker. She continues Dulera, Spiriva, and using albuterol sporadically up until this week. This week needing to use albuterol more often. States her breathing worse over the last week or so however she has been out of Spiriva for 3 weeks.      Diagnosed with severe obstructive sleep apnea on the study done 2/25/24. (, desat to 80% with time at or below 89% 136.2 minutes, weight 352 lbs)   PAP titration study done 3/13/24 showed pressure of Bipap best controlled apnea.  Patients weight during the study was 352 lbs    Patient had symptoms of EDS, snoring and poor sleep quality at time of diagnosis, resolved with pap therapy. Denies HA, ear popping or belching with PAP use.              3/14/2025    10:34 AM 7/9/2024     6:14 PM 1/25/2024     9:10 AM   Sleep Medicine   Sitting and reading 0 1 3   Watching TV 0 1 3   Sitting, inactive in a public place (e.g. a theatre or a meeting) 0 0 0   As a passenger in a car for an hour without a break 0 0 0   Lying down to rest in the afternoon when circumstances permit 0 1 3   Sitting and talking to someone 0 0 0   Sitting quietly after a lunch without alcohol 0 1 0   In a car, while stopped for a few minutes in traffic 0 0 0   [place] Sleepiness Score 0 4 9   Neck (Inches) 20.5  20     0 = no chance of dozing  1 = slight chance of dozing  2 = moderate chance of dozing  3 = high chance of dozing    Interpretation:   0-7:     It is unlikely that you are abnormally sleepy.   8-9:     You have an average amount of daytime sleepiness.   10-15: You may be excessively sleepy depending on the situation. You may want to consider seeking medical attention.   16-24:  You are excessively sleepy and should consider seeking medical

## 2025-03-14 ENCOUNTER — OFFICE VISIT (OUTPATIENT)
Dept: PULMONOLOGY | Age: 60
End: 2025-03-14
Payer: COMMERCIAL

## 2025-03-14 VITALS
WEIGHT: 293 LBS | HEIGHT: 66 IN | TEMPERATURE: 97.3 F | HEART RATE: 94 BPM | SYSTOLIC BLOOD PRESSURE: 146 MMHG | RESPIRATION RATE: 12 BRPM | BODY MASS INDEX: 47.09 KG/M2 | DIASTOLIC BLOOD PRESSURE: 76 MMHG | OXYGEN SATURATION: 92 %

## 2025-03-14 DIAGNOSIS — I10 ESSENTIAL HYPERTENSION: ICD-10-CM

## 2025-03-14 DIAGNOSIS — J45.40 MODERATE PERSISTENT ASTHMA WITHOUT COMPLICATION: Primary | ICD-10-CM

## 2025-03-14 DIAGNOSIS — G47.33 OSA (OBSTRUCTIVE SLEEP APNEA): ICD-10-CM

## 2025-03-14 DIAGNOSIS — E66.01 MORBID OBESITY WITH BMI OF 50.0-59.9, ADULT: ICD-10-CM

## 2025-03-14 PROCEDURE — 3077F SYST BP >= 140 MM HG: CPT | Performed by: NURSE PRACTITIONER

## 2025-03-14 PROCEDURE — 3078F DIAST BP <80 MM HG: CPT | Performed by: NURSE PRACTITIONER

## 2025-03-14 PROCEDURE — 99214 OFFICE O/P EST MOD 30 MIN: CPT | Performed by: NURSE PRACTITIONER

## 2025-03-14 RX ORDER — TIOTROPIUM BROMIDE INHALATION SPRAY 1.56 UG/1
2 SPRAY, METERED RESPIRATORY (INHALATION) DAILY
Qty: 4 G | Refills: 5 | Status: SHIPPED | OUTPATIENT
Start: 2025-03-14

## 2025-03-14 RX ORDER — ALBUTEROL SULFATE 90 UG/1
2 INHALANT RESPIRATORY (INHALATION) EVERY 6 HOURS PRN
Qty: 18 G | Refills: 3 | Status: CANCELLED | OUTPATIENT
Start: 2025-03-14

## 2025-03-14 ASSESSMENT — ENCOUNTER SYMPTOMS
EYE PAIN: 0
RHINORRHEA: 0
CHEST TIGHTNESS: 0
WHEEZING: 0
SORE THROAT: 0
COUGH: 0
SHORTNESS OF BREATH: 1
ABDOMINAL PAIN: 0

## 2025-03-14 ASSESSMENT — SLEEP AND FATIGUE QUESTIONNAIRES
ESS TOTAL SCORE: 0
HOW LIKELY ARE YOU TO NOD OFF OR FALL ASLEEP WHILE SITTING AND TALKING TO SOMEONE: WOULD NEVER DOZE
HOW LIKELY ARE YOU TO NOD OFF OR FALL ASLEEP IN A CAR, WHILE STOPPED FOR A FEW MINUTES IN TRAFFIC: WOULD NEVER DOZE
HOW LIKELY ARE YOU TO NOD OFF OR FALL ASLEEP WHILE WATCHING TV: WOULD NEVER DOZE
HOW LIKELY ARE YOU TO NOD OFF OR FALL ASLEEP WHEN YOU ARE A PASSENGER IN A CAR FOR AN HOUR WITHOUT A BREAK: WOULD NEVER DOZE
HOW LIKELY ARE YOU TO NOD OFF OR FALL ASLEEP WHILE SITTING AND READING: WOULD NEVER DOZE
HOW LIKELY ARE YOU TO NOD OFF OR FALL ASLEEP WHILE LYING DOWN TO REST IN THE AFTERNOON WHEN CIRCUMSTANCES PERMIT: WOULD NEVER DOZE
HOW LIKELY ARE YOU TO NOD OFF OR FALL ASLEEP WHILE SITTING INACTIVE IN A PUBLIC PLACE: WOULD NEVER DOZE
HOW LIKELY ARE YOU TO NOD OFF OR FALL ASLEEP WHILE SITTING QUIETLY AFTER LUNCH WITHOUT ALCOHOL: WOULD NEVER DOZE

## 2025-03-14 NOTE — PATIENT INSTRUCTIONS
Call with worsening symptoms such as increased shortness of breath, productive cough, wheezing or symptoms not responding to treatment plan.     Get Alpha one testing completed as discussed    Your current pulmonary medications are controlling/treating your asthma.  Stay compliant.  Reviewed present pulmonary medications and side effects.  Reviewed proper inhaler usage.    In the next few weeks, you will be receiving a survey from [facility] regarding your visit today.  We would greatly appreciate it if you would take just a few minutes to fill that out.  It is very important to us that our patients receive top notch care and our surveys help keep us accountable. However, if your experience was not a good one, we want to hear about that as well. This is a key way we can keep track of problems and strive to correct any for future visits.    Again, we appreciate your time and thank you [facility]!    Montserrat Forbes CMA The medication list included in this document is our record of what you are currently taking, including any changes that were made at today's visit.  If you find any differences when compared to your medications at home, or have any questions that were not answered at your visit, please contact the office.Remember to bring a list of pulmonary medications and any CPAP or BiPAP machines to your next appointment with the office.     Please keep all of your future appointments scheduled by [facility] office. Out of respect for other patients and providers, you may be asked to reschedule your appointment if you arrive later than your scheduled appointment time. Appointments cancelled less than 24hrs in advance will be considered a no show. Patients with three missed appointments within 1 year or four missed appointments within 2 years can be dismissed from the practice.     Please be aware that our physicians are required to work in the Intensive Care Unit

## 2025-03-14 NOTE — PROGRESS NOTES
MA Communication:  The following orders are received by verbal communication from   Janis Rice CNP    Orders include:  6mo fu lungs

## 2025-04-29 RX ORDER — ALBUTEROL SULFATE 90 UG/1
2 INHALANT RESPIRATORY (INHALATION) EVERY 6 HOURS PRN
Qty: 8.5 G | Refills: 5 | Status: SHIPPED | OUTPATIENT
Start: 2025-04-29

## 2025-06-26 ENCOUNTER — APPOINTMENT (OUTPATIENT)
Dept: GENERAL RADIOLOGY | Age: 60
End: 2025-06-26
Payer: COMMERCIAL

## 2025-06-26 ENCOUNTER — APPOINTMENT (OUTPATIENT)
Dept: CT IMAGING | Age: 60
End: 2025-06-26
Payer: COMMERCIAL

## 2025-06-26 ENCOUNTER — HOSPITAL ENCOUNTER (INPATIENT)
Age: 60
LOS: 1 days | Discharge: HOME | End: 2025-06-27
Attending: EMERGENCY MEDICINE | Admitting: STUDENT IN AN ORGANIZED HEALTH CARE EDUCATION/TRAINING PROGRAM
Payer: COMMERCIAL

## 2025-06-26 DIAGNOSIS — I10 HYPERTENSION, UNSPECIFIED TYPE: ICD-10-CM

## 2025-06-26 DIAGNOSIS — R91.8 ABNORMAL CT SCAN OF LUNG: ICD-10-CM

## 2025-06-26 DIAGNOSIS — I50.9 CONGESTIVE HEART FAILURE, UNSPECIFIED HF CHRONICITY, UNSPECIFIED HEART FAILURE TYPE (HCC): ICD-10-CM

## 2025-06-26 DIAGNOSIS — R20.2 PARESTHESIAS: ICD-10-CM

## 2025-06-26 DIAGNOSIS — G45.1 HEMISPHERIC CAROTID ARTERY SYNDROME: ICD-10-CM

## 2025-06-26 DIAGNOSIS — R20.2 NUMBNESS AND TINGLING IN BOTH HANDS: ICD-10-CM

## 2025-06-26 DIAGNOSIS — R20.0 NUMBNESS AND TINGLING IN BOTH HANDS: ICD-10-CM

## 2025-06-26 DIAGNOSIS — R51.9 RECURRENT HEADACHE: ICD-10-CM

## 2025-06-26 DIAGNOSIS — R20.0 LEFT SIDED NUMBNESS: Primary | ICD-10-CM

## 2025-06-26 LAB
ALBUMIN SERPL-MCNC: 4.9 G/DL (ref 3.4–5)
ALBUMIN/GLOB SERPL: 1.8 {RATIO} (ref 1.1–2.2)
ALP SERPL-CCNC: 116 U/L (ref 40–129)
ALT SERPL-CCNC: 67 U/L (ref 10–40)
ANION GAP SERPL CALCULATED.3IONS-SCNC: 14 MMOL/L (ref 3–16)
AST SERPL-CCNC: 46 U/L (ref 15–37)
BASOPHILS # BLD: 0.1 K/UL (ref 0–0.2)
BASOPHILS NFR BLD: 0.9 %
BILIRUB SERPL-MCNC: 0.4 MG/DL (ref 0–1)
BUN SERPL-MCNC: 21 MG/DL (ref 7–20)
CALCIUM SERPL-MCNC: 10.7 MG/DL (ref 8.3–10.6)
CHLORIDE SERPL-SCNC: 100 MMOL/L (ref 99–110)
CO2 SERPL-SCNC: 26 MMOL/L (ref 21–32)
CREAT SERPL-MCNC: 0.8 MG/DL (ref 0.6–1.2)
CRP SERPL-MCNC: <3 MG/L (ref 0–5.1)
DEPRECATED RDW RBC AUTO: 14 % (ref 12.4–15.4)
EOSINOPHIL # BLD: 0.1 K/UL (ref 0–0.6)
EOSINOPHIL NFR BLD: 1.1 %
GFR SERPLBLD CREATININE-BSD FMLA CKD-EPI: 84 ML/MIN/{1.73_M2}
GLUCOSE BLD-MCNC: 175 MG/DL (ref 70–99)
GLUCOSE SERPL-MCNC: 151 MG/DL (ref 70–99)
HCT VFR BLD AUTO: 40 % (ref 36–48)
HGB BLD-MCNC: 13.5 G/DL (ref 12–16)
LYMPHOCYTES # BLD: 1.2 K/UL (ref 1–5.1)
LYMPHOCYTES NFR BLD: 11.7 %
MCH RBC QN AUTO: 31 PG (ref 26–34)
MCHC RBC AUTO-ENTMCNC: 33.7 G/DL (ref 31–36)
MCV RBC AUTO: 92 FL (ref 80–100)
MONOCYTES # BLD: 0.9 K/UL (ref 0–1.3)
MONOCYTES NFR BLD: 8.2 %
NEUTROPHILS # BLD: 8.3 K/UL (ref 1.7–7.7)
NEUTROPHILS NFR BLD: 78.1 %
NT-PROBNP SERPL-MCNC: 127 PG/ML (ref 0–124)
PERFORMED ON: ABNORMAL
PLATELET # BLD AUTO: 398 K/UL (ref 135–450)
PMV BLD AUTO: 7.1 FL (ref 5–10.5)
POTASSIUM SERPL-SCNC: 4.2 MMOL/L (ref 3.5–5.1)
PROCALCITONIN SERPL IA-MCNC: 0.06 NG/ML (ref 0–0.15)
PROT SERPL-MCNC: 7.6 G/DL (ref 6.4–8.2)
RBC # BLD AUTO: 4.35 M/UL (ref 4–5.2)
SODIUM SERPL-SCNC: 140 MMOL/L (ref 136–145)
TROPONIN, HIGH SENSITIVITY: 11 NG/L (ref 0–14)
WBC # BLD AUTO: 10.6 K/UL (ref 4–11)

## 2025-06-26 PROCEDURE — 99285 EMERGENCY DEPT VISIT HI MDM: CPT

## 2025-06-26 PROCEDURE — 36415 COLL VENOUS BLD VENIPUNCTURE: CPT

## 2025-06-26 PROCEDURE — 85025 COMPLETE CBC W/AUTO DIFF WBC: CPT

## 2025-06-26 PROCEDURE — 84145 PROCALCITONIN (PCT): CPT

## 2025-06-26 PROCEDURE — 85652 RBC SED RATE AUTOMATED: CPT

## 2025-06-26 PROCEDURE — 80053 COMPREHEN METABOLIC PANEL: CPT

## 2025-06-26 PROCEDURE — 70496 CT ANGIOGRAPHY HEAD: CPT

## 2025-06-26 PROCEDURE — 86140 C-REACTIVE PROTEIN: CPT

## 2025-06-26 PROCEDURE — 84484 ASSAY OF TROPONIN QUANT: CPT

## 2025-06-26 PROCEDURE — 6370000000 HC RX 637 (ALT 250 FOR IP): Performed by: EMERGENCY MEDICINE

## 2025-06-26 PROCEDURE — 6360000004 HC RX CONTRAST MEDICATION: Performed by: EMERGENCY MEDICINE

## 2025-06-26 PROCEDURE — 93005 ELECTROCARDIOGRAM TRACING: CPT

## 2025-06-26 PROCEDURE — 70450 CT HEAD/BRAIN W/O DYE: CPT

## 2025-06-26 PROCEDURE — 83880 ASSAY OF NATRIURETIC PEPTIDE: CPT

## 2025-06-26 PROCEDURE — 71045 X-RAY EXAM CHEST 1 VIEW: CPT

## 2025-06-26 RX ORDER — ASPIRIN 81 MG/1
81 TABLET, CHEWABLE ORAL ONCE
Status: COMPLETED | OUTPATIENT
Start: 2025-06-26 | End: 2025-06-26

## 2025-06-26 RX ORDER — IOPAMIDOL 755 MG/ML
75 INJECTION, SOLUTION INTRAVASCULAR
Status: COMPLETED | OUTPATIENT
Start: 2025-06-26 | End: 2025-06-26

## 2025-06-26 RX ORDER — FUROSEMIDE 10 MG/ML
40 INJECTION INTRAMUSCULAR; INTRAVENOUS ONCE
Status: COMPLETED | OUTPATIENT
Start: 2025-06-26 | End: 2025-06-27

## 2025-06-26 RX ORDER — PROCHLORPERAZINE MALEATE 5 MG/1
5 TABLET ORAL ONCE
Status: COMPLETED | OUTPATIENT
Start: 2025-06-26 | End: 2025-06-26

## 2025-06-26 RX ADMIN — PROCHLORPERAZINE MALEATE 5 MG: 5 TABLET, FILM COATED ORAL at 23:46

## 2025-06-26 RX ADMIN — ASPIRIN 81 MG: 81 TABLET, CHEWABLE ORAL at 23:45

## 2025-06-26 RX ADMIN — IOPAMIDOL 75 ML: 755 INJECTION, SOLUTION INTRAVENOUS at 18:56

## 2025-06-26 ASSESSMENT — PAIN DESCRIPTION - PAIN TYPE: TYPE: ACUTE PAIN

## 2025-06-26 ASSESSMENT — PAIN - FUNCTIONAL ASSESSMENT: PAIN_FUNCTIONAL_ASSESSMENT: 0-10

## 2025-06-26 ASSESSMENT — PAIN DESCRIPTION - LOCATION: LOCATION: HEAD

## 2025-06-26 ASSESSMENT — PAIN DESCRIPTION - DESCRIPTORS: DESCRIPTORS: SHOOTING

## 2025-06-26 ASSESSMENT — PAIN SCALES - GENERAL: PAINLEVEL_OUTOF10: 7

## 2025-06-26 NOTE — ED PROVIDER NOTES
[facility] EMERGENCY DEPARTMENT  EMERGENCY DEPARTMENT ENCOUNTER        Pt Name: Marcelina Drummond  MRN: [id number]  Birthdate 1965  Date of evaluation: 6/26/2025  Provider: KIRILL Rodriguez Jr  PCP: Alexander Dumont MD  Note Started: 5:18 PM EDT 6/26/25       I have seen and evaluated this patient with my supervising physician Demario Siddiqi MD.      CHIEF COMPLAINT       Chief Complaint   Patient presents with    Tingling     Pt reports she was at physical therapy today for low back pain. Says she's had left side tingling since noon yesterday. States at PT she told the therapist and he checked her BP and it was high. Pt reporting she takes multiple medications including a BP medication. Says her therapist thought it was best to go to the ED for evaluation.     Hypertension    Nausea    Headache     Pt reports if she looks up it radiates pain down her neck into her spine and causes her to have a headache       HISTORY OF PRESENT ILLNESS: 1 or more Elements     History from : Patient    Limitations to history : None    Marcelina Drummond is a 60 y.o. female who presents with reports of left-sided numbness on the entire left side of her body that started yesterday around noon.  She is in physical therapy for chronic back pain.  States it does not seem like it has been getting any better so she went to come in for evaluation.  She has been able to ambulate.  Not experiencing any vision disturbances chest pain or shortness of breath at this time however, she did have a little bit of chest discomfort on the left side earlier today.  She does have a mild headache but no blurry vision.  She is not on any anticoagulation medications.  She has no other complaints at this time.  Review of systems otherwise negative.    Nursing Notes were all reviewed and agreed with or any disagreements were addressed in the HPI.      SURGICAL HISTORY     Past Surgical History:   Procedure Laterality Date    CARPAL TUNNEL  following medications:  Medications - No data to display          Is this patient to be included in the SEP-1 core measure? No   Exclusion criteria - the patient is NOT to be included for SEP-1 Core Measure due to:  2+ SIRS criteria are not met      CONSULTS: (Who and What was discussed)  None  Discussion with Other Profesionals : None    Social Determinants : None    Records Reviewed : Inpatient Notes      Ddx:   CVA, TIA, LVO, anxiety    Medical Decision Making:   This is a 60-year-old female presenting to the emergency department complaints of left-sided numbness and tingling that started yesterday around noon.  Symptoms did not improve so she became concerned and came to the emergency department.  She is moderately hypertensive at 192/78.  She has bilateral tremors that do not seem to be worse on one side compared to the other.  She does have some motor weakness in the left leg on exam as well as sensory weakness in the left lower extremity.  She has an NIH of 2.  No dysarthria or aphasia or altered mental status.  Will evaluate with lab work including troponins considering that she had left-sided chest pain earlier today.  Will obtain CT CTA however, she is well outside of the window for therapeutic intervention send no stroke alert at this time.    Patient's workup is pending at the time of my handoff.  Please see the attending physician's note for further details on final disposition and reevaluation.      I am the Primary Clinician of Record.    FINAL IMPRESSION      1. Left-sided weakness    2. Essential hypertension          DISPOSITION/PLAN     DISPOSITION     PATIENT REFERRED TO:  No follow-up provider specified.    DISCHARGE MEDICATIONS:  New Prescriptions    No medications on file       DISCONTINUED MEDICATIONS:  Discontinued Medications    No medications on file              (Please note that portions of this note were completed with a voice recognition program.  Efforts were made to edit the

## 2025-06-27 ENCOUNTER — TELEPHONE (OUTPATIENT)
Dept: CARDIOLOGY CLINIC | Age: 60
End: 2025-06-27

## 2025-06-27 ENCOUNTER — APPOINTMENT (OUTPATIENT)
Dept: MRI IMAGING | Age: 60
End: 2025-06-27
Payer: COMMERCIAL

## 2025-06-27 ENCOUNTER — CARE COORDINATION (OUTPATIENT)
Dept: CASE MANAGEMENT | Age: 60
End: 2025-06-27

## 2025-06-27 ENCOUNTER — ANCILLARY PROCEDURE (OUTPATIENT)
Dept: CARDIOLOGY CLINIC | Age: 60
End: 2025-06-27

## 2025-06-27 VITALS
OXYGEN SATURATION: 92 % | RESPIRATION RATE: 18 BRPM | HEIGHT: 66 IN | BODY MASS INDEX: 47.09 KG/M2 | TEMPERATURE: 98.1 F | SYSTOLIC BLOOD PRESSURE: 163 MMHG | DIASTOLIC BLOOD PRESSURE: 79 MMHG | WEIGHT: 293 LBS | HEART RATE: 84 BPM

## 2025-06-27 DIAGNOSIS — G45.1 HEMISPHERIC CAROTID ARTERY SYNDROME: ICD-10-CM

## 2025-06-27 PROBLEM — R53.1 ACUTE LEFT-SIDED WEAKNESS: Status: ACTIVE | Noted: 2025-06-27

## 2025-06-27 LAB
ALBUMIN SERPL-MCNC: 4.5 G/DL (ref 3.4–5)
ALP SERPL-CCNC: 79 U/L (ref 40–129)
ALT SERPL-CCNC: 61 U/L (ref 10–40)
ANION GAP SERPL CALCULATED.3IONS-SCNC: 15 MMOL/L (ref 3–16)
APTT BLD: 28.9 SEC (ref 22.8–35.8)
AST SERPL-CCNC: 44 U/L (ref 15–37)
BILIRUB DIRECT SERPL-MCNC: 0.3 MG/DL (ref 0–0.3)
BILIRUB INDIRECT SERPL-MCNC: 0.2 MG/DL (ref 0–1)
BILIRUB SERPL-MCNC: 0.5 MG/DL (ref 0–1)
BUN SERPL-MCNC: 21 MG/DL (ref 7–20)
CALCIUM SERPL-MCNC: 9.7 MG/DL (ref 8.3–10.6)
CHLORIDE SERPL-SCNC: 98 MMOL/L (ref 99–110)
CHOLEST SERPL-MCNC: 215 MG/DL (ref 0–199)
CO2 SERPL-SCNC: 26 MMOL/L (ref 21–32)
CREAT SERPL-MCNC: 0.7 MG/DL (ref 0.6–1.2)
DEPRECATED RDW RBC AUTO: 13.9 % (ref 12.4–15.4)
ECHO BSA: 2.66 M2
EKG ATRIAL RATE: 73 BPM
EKG ATRIAL RATE: 82 BPM
EKG DIAGNOSIS: NORMAL
EKG DIAGNOSIS: NORMAL
EKG P AXIS: 13 DEGREES
EKG P AXIS: 48 DEGREES
EKG P-R INTERVAL: 188 MS
EKG P-R INTERVAL: 198 MS
EKG Q-T INTERVAL: 368 MS
EKG Q-T INTERVAL: 408 MS
EKG QRS DURATION: 80 MS
EKG QRS DURATION: 86 MS
EKG QTC CALCULATION (BAZETT): 429 MS
EKG QTC CALCULATION (BAZETT): 449 MS
EKG R AXIS: 101 DEGREES
EKG R AXIS: 97 DEGREES
EKG T AXIS: 18 DEGREES
EKG T AXIS: 34 DEGREES
EKG VENTRICULAR RATE: 73 BPM
EKG VENTRICULAR RATE: 82 BPM
ERYTHROCYTE [SEDIMENTATION RATE] IN BLOOD BY WESTERGREN METHOD: 20 MM/HR (ref 0–30)
EST. AVERAGE GLUCOSE BLD GHB EST-MCNC: 191.5 MG/DL
FERRITIN SERPL IA-MCNC: 112 NG/ML (ref 15–150)
GFR SERPLBLD CREATININE-BSD FMLA CKD-EPI: >90 ML/MIN/{1.73_M2}
GLUCOSE BLD-MCNC: 150 MG/DL (ref 70–99)
GLUCOSE BLD-MCNC: 160 MG/DL (ref 70–99)
GLUCOSE BLD-MCNC: 167 MG/DL (ref 70–99)
GLUCOSE BLD-MCNC: 183 MG/DL (ref 70–99)
GLUCOSE SERPL-MCNC: 148 MG/DL (ref 70–99)
HBA1C MFR BLD: 8.3 %
HCT VFR BLD AUTO: 36.7 % (ref 36–48)
HDLC SERPL-MCNC: 58 MG/DL (ref 40–60)
HGB BLD-MCNC: 12.6 G/DL (ref 12–16)
INR PPP: 1.08 (ref 0.86–1.14)
IRON SATN MFR SERPL: 25 % (ref 15–50)
IRON SERPL-MCNC: 105 UG/DL (ref 37–145)
LDLC SERPL CALC-MCNC: 120 MG/DL
MAGNESIUM SERPL-MCNC: 1.85 MG/DL (ref 1.8–2.4)
MCH RBC QN AUTO: 31.4 PG (ref 26–34)
MCHC RBC AUTO-ENTMCNC: 34.3 G/DL (ref 31–36)
MCV RBC AUTO: 91.4 FL (ref 80–100)
PERFORMED ON: ABNORMAL
PHOSPHATE SERPL-MCNC: 4.9 MG/DL (ref 2.5–4.9)
PLATELET # BLD AUTO: 378 K/UL (ref 135–450)
PMV BLD AUTO: 7.1 FL (ref 5–10.5)
POTASSIUM SERPL-SCNC: 3.4 MMOL/L (ref 3.5–5.1)
PROT SERPL-MCNC: 7.2 G/DL (ref 6.4–8.2)
PROTHROMBIN TIME: 14.3 SEC (ref 12.1–14.9)
RBC # BLD AUTO: 4.01 M/UL (ref 4–5.2)
SODIUM SERPL-SCNC: 139 MMOL/L (ref 136–145)
TIBC SERPL-MCNC: 417 UG/DL (ref 260–445)
TRIGL SERPL-MCNC: 186 MG/DL (ref 0–150)
VLDLC SERPL CALC-MCNC: 37 MG/DL
WBC # BLD AUTO: 12.3 K/UL (ref 4–11)

## 2025-06-27 PROCEDURE — 2500000003 HC RX 250 WO HCPCS: Performed by: STUDENT IN AN ORGANIZED HEALTH CARE EDUCATION/TRAINING PROGRAM

## 2025-06-27 PROCEDURE — 6360000002 HC RX W HCPCS: Performed by: EMERGENCY MEDICINE

## 2025-06-27 PROCEDURE — 80048 BASIC METABOLIC PNL TOTAL CA: CPT

## 2025-06-27 PROCEDURE — 97161 PT EVAL LOW COMPLEX 20 MIN: CPT

## 2025-06-27 PROCEDURE — 83036 HEMOGLOBIN GLYCOSYLATED A1C: CPT

## 2025-06-27 PROCEDURE — 6370000000 HC RX 637 (ALT 250 FOR IP)

## 2025-06-27 PROCEDURE — 83550 IRON BINDING TEST: CPT

## 2025-06-27 PROCEDURE — 83735 ASSAY OF MAGNESIUM: CPT

## 2025-06-27 PROCEDURE — 93005 ELECTROCARDIOGRAM TRACING: CPT | Performed by: STUDENT IN AN ORGANIZED HEALTH CARE EDUCATION/TRAINING PROGRAM

## 2025-06-27 PROCEDURE — 83540 ASSAY OF IRON: CPT

## 2025-06-27 PROCEDURE — 97530 THERAPEUTIC ACTIVITIES: CPT

## 2025-06-27 PROCEDURE — 6370000000 HC RX 637 (ALT 250 FOR IP): Performed by: STUDENT IN AN ORGANIZED HEALTH CARE EDUCATION/TRAINING PROGRAM

## 2025-06-27 PROCEDURE — 1200000000 HC SEMI PRIVATE

## 2025-06-27 PROCEDURE — 97166 OT EVAL MOD COMPLEX 45 MIN: CPT

## 2025-06-27 PROCEDURE — 85610 PROTHROMBIN TIME: CPT

## 2025-06-27 PROCEDURE — 80061 LIPID PANEL: CPT

## 2025-06-27 PROCEDURE — 70551 MRI BRAIN STEM W/O DYE: CPT

## 2025-06-27 PROCEDURE — 85730 THROMBOPLASTIN TIME PARTIAL: CPT

## 2025-06-27 PROCEDURE — 84100 ASSAY OF PHOSPHORUS: CPT

## 2025-06-27 PROCEDURE — 85027 COMPLETE CBC AUTOMATED: CPT

## 2025-06-27 PROCEDURE — 82728 ASSAY OF FERRITIN: CPT

## 2025-06-27 PROCEDURE — 6360000002 HC RX W HCPCS: Performed by: STUDENT IN AN ORGANIZED HEALTH CARE EDUCATION/TRAINING PROGRAM

## 2025-06-27 PROCEDURE — 97535 SELF CARE MNGMENT TRAINING: CPT

## 2025-06-27 PROCEDURE — 99222 1ST HOSP IP/OBS MODERATE 55: CPT | Performed by: STUDENT IN AN ORGANIZED HEALTH CARE EDUCATION/TRAINING PROGRAM

## 2025-06-27 PROCEDURE — 94640 AIRWAY INHALATION TREATMENT: CPT

## 2025-06-27 PROCEDURE — 97116 GAIT TRAINING THERAPY: CPT

## 2025-06-27 PROCEDURE — 94660 CPAP INITIATION&MGMT: CPT

## 2025-06-27 PROCEDURE — 80076 HEPATIC FUNCTION PANEL: CPT

## 2025-06-27 PROCEDURE — 36415 COLL VENOUS BLD VENIPUNCTURE: CPT

## 2025-06-27 RX ORDER — ACETAMINOPHEN 650 MG/1
650 SUPPOSITORY RECTAL EVERY 4 HOURS PRN
Status: DISCONTINUED | OUTPATIENT
Start: 2025-06-27 | End: 2025-06-27 | Stop reason: HOSPADM

## 2025-06-27 RX ORDER — ALBUTEROL SULFATE 90 UG/1
2 INHALANT RESPIRATORY (INHALATION) EVERY 6 HOURS PRN
Status: DISCONTINUED | OUTPATIENT
Start: 2025-06-27 | End: 2025-06-27 | Stop reason: HOSPADM

## 2025-06-27 RX ORDER — ONDANSETRON 2 MG/ML
4 INJECTION INTRAMUSCULAR; INTRAVENOUS EVERY 6 HOURS PRN
Status: DISCONTINUED | OUTPATIENT
Start: 2025-06-27 | End: 2025-06-27 | Stop reason: HOSPADM

## 2025-06-27 RX ORDER — INSULIN GLARGINE 100 [IU]/ML
30 INJECTION, SOLUTION SUBCUTANEOUS NIGHTLY
COMMUNITY

## 2025-06-27 RX ORDER — CLOPIDOGREL BISULFATE 75 MG/1
75 TABLET ORAL DAILY
Status: DISCONTINUED | OUTPATIENT
Start: 2025-06-28 | End: 2025-06-27 | Stop reason: HOSPADM

## 2025-06-27 RX ORDER — SODIUM CHLORIDE 9 MG/ML
INJECTION, SOLUTION INTRAVENOUS PRN
Status: DISCONTINUED | OUTPATIENT
Start: 2025-06-27 | End: 2025-06-27 | Stop reason: HOSPADM

## 2025-06-27 RX ORDER — ONDANSETRON 4 MG/1
4 TABLET, ORALLY DISINTEGRATING ORAL EVERY 8 HOURS PRN
Status: DISCONTINUED | OUTPATIENT
Start: 2025-06-27 | End: 2025-06-27 | Stop reason: HOSPADM

## 2025-06-27 RX ORDER — BUDESONIDE AND FORMOTEROL FUMARATE DIHYDRATE 160; 4.5 UG/1; UG/1
2 AEROSOL RESPIRATORY (INHALATION)
Status: DISCONTINUED | OUTPATIENT
Start: 2025-06-27 | End: 2025-06-27 | Stop reason: HOSPADM

## 2025-06-27 RX ORDER — POTASSIUM CHLORIDE 1500 MG/1
20 TABLET, EXTENDED RELEASE ORAL DAILY
Status: DISCONTINUED | OUTPATIENT
Start: 2025-06-27 | End: 2025-06-27 | Stop reason: HOSPADM

## 2025-06-27 RX ORDER — EZETIMIBE 10 MG/1
10 TABLET ORAL NIGHTLY
Qty: 30 TABLET | Refills: 3 | Status: SHIPPED | OUTPATIENT
Start: 2025-06-27

## 2025-06-27 RX ORDER — GUANFACINE 1 MG/1
2 TABLET ORAL 2 TIMES DAILY
Status: DISCONTINUED | OUTPATIENT
Start: 2025-06-27 | End: 2025-06-27 | Stop reason: HOSPADM

## 2025-06-27 RX ORDER — ACETAMINOPHEN 325 MG/1
650 TABLET ORAL EVERY 4 HOURS PRN
Status: DISCONTINUED | OUTPATIENT
Start: 2025-06-27 | End: 2025-06-27 | Stop reason: HOSPADM

## 2025-06-27 RX ORDER — HYDROCORTISONE 25 MG/G
CREAM TOPICAL 2 TIMES DAILY
COMMUNITY

## 2025-06-27 RX ORDER — INSULIN LISPRO 100 [IU]/ML
0-8 INJECTION, SOLUTION INTRAVENOUS; SUBCUTANEOUS
Status: DISCONTINUED | OUTPATIENT
Start: 2025-06-27 | End: 2025-06-27 | Stop reason: HOSPADM

## 2025-06-27 RX ORDER — LOSARTAN POTASSIUM 100 MG/1
100 TABLET ORAL DAILY
Status: DISCONTINUED | OUTPATIENT
Start: 2025-06-27 | End: 2025-06-27 | Stop reason: HOSPADM

## 2025-06-27 RX ORDER — SODIUM CHLORIDE 0.9 % (FLUSH) 0.9 %
5-40 SYRINGE (ML) INJECTION EVERY 12 HOURS SCHEDULED
Status: DISCONTINUED | OUTPATIENT
Start: 2025-06-27 | End: 2025-06-27 | Stop reason: HOSPADM

## 2025-06-27 RX ORDER — ASPIRIN 300 MG/1
300 SUPPOSITORY RECTAL DAILY
Status: DISCONTINUED | OUTPATIENT
Start: 2025-06-27 | End: 2025-06-27

## 2025-06-27 RX ORDER — ASPIRIN 81 MG/1
81 TABLET, CHEWABLE ORAL DAILY
Qty: 20 TABLET | Refills: 0 | Status: SHIPPED | OUTPATIENT
Start: 2025-06-28 | End: 2025-07-18

## 2025-06-27 RX ORDER — ATORVASTATIN CALCIUM 80 MG/1
80 TABLET, FILM COATED ORAL NIGHTLY
Status: DISCONTINUED | OUTPATIENT
Start: 2025-06-27 | End: 2025-06-27

## 2025-06-27 RX ORDER — AMLODIPINE BESYLATE 5 MG/1
10 TABLET ORAL DAILY
Status: DISCONTINUED | OUTPATIENT
Start: 2025-06-27 | End: 2025-06-27 | Stop reason: HOSPADM

## 2025-06-27 RX ORDER — CLOPIDOGREL BISULFATE 75 MG/1
75 TABLET ORAL DAILY
Qty: 30 TABLET | Refills: 3 | Status: SHIPPED | OUTPATIENT
Start: 2025-06-28

## 2025-06-27 RX ORDER — FUROSEMIDE 40 MG/1
80 TABLET ORAL DAILY
Status: DISCONTINUED | OUTPATIENT
Start: 2025-06-27 | End: 2025-06-27 | Stop reason: HOSPADM

## 2025-06-27 RX ORDER — SENNOSIDES 8.6 MG
325 CAPSULE ORAL ONCE
Status: CANCELLED | OUTPATIENT
Start: 2025-06-27

## 2025-06-27 RX ORDER — ENOXAPARIN SODIUM 100 MG/ML
40 INJECTION SUBCUTANEOUS 2 TIMES DAILY
Status: DISCONTINUED | OUTPATIENT
Start: 2025-06-27 | End: 2025-06-27 | Stop reason: HOSPADM

## 2025-06-27 RX ORDER — GLUCAGON 1 MG/ML
1 KIT INJECTION PRN
Status: DISCONTINUED | OUTPATIENT
Start: 2025-06-27 | End: 2025-06-27 | Stop reason: HOSPADM

## 2025-06-27 RX ORDER — ASPIRIN 81 MG/1
81 TABLET, CHEWABLE ORAL DAILY
Status: DISCONTINUED | OUTPATIENT
Start: 2025-06-27 | End: 2025-06-27 | Stop reason: HOSPADM

## 2025-06-27 RX ORDER — POTASSIUM CHLORIDE 1500 MG/1
40 TABLET, EXTENDED RELEASE ORAL ONCE
Status: COMPLETED | OUTPATIENT
Start: 2025-06-27 | End: 2025-06-27

## 2025-06-27 RX ORDER — SODIUM CHLORIDE 0.9 % (FLUSH) 0.9 %
5-40 SYRINGE (ML) INJECTION PRN
Status: DISCONTINUED | OUTPATIENT
Start: 2025-06-27 | End: 2025-06-27 | Stop reason: HOSPADM

## 2025-06-27 RX ORDER — CLOPIDOGREL BISULFATE 75 MG/1
300 TABLET ORAL ONCE
Status: COMPLETED | OUTPATIENT
Start: 2025-06-27 | End: 2025-06-27

## 2025-06-27 RX ORDER — DEXTROSE MONOHYDRATE 100 MG/ML
INJECTION, SOLUTION INTRAVENOUS CONTINUOUS PRN
Status: DISCONTINUED | OUTPATIENT
Start: 2025-06-27 | End: 2025-06-27 | Stop reason: HOSPADM

## 2025-06-27 RX ORDER — EZETIMIBE 10 MG/1
10 TABLET ORAL NIGHTLY
Status: DISCONTINUED | OUTPATIENT
Start: 2025-06-27 | End: 2025-06-27 | Stop reason: HOSPADM

## 2025-06-27 RX ORDER — POLYETHYLENE GLYCOL 3350 17 G/17G
17 POWDER, FOR SOLUTION ORAL DAILY PRN
Status: DISCONTINUED | OUTPATIENT
Start: 2025-06-27 | End: 2025-06-27 | Stop reason: HOSPADM

## 2025-06-27 RX ADMIN — Medication 2 PUFF: at 07:46

## 2025-06-27 RX ADMIN — POTASSIUM CHLORIDE 20 MEQ: 1500 TABLET, EXTENDED RELEASE ORAL at 08:58

## 2025-06-27 RX ADMIN — LOSARTAN POTASSIUM 100 MG: 100 TABLET, FILM COATED ORAL at 08:58

## 2025-06-27 RX ADMIN — ENOXAPARIN SODIUM 40 MG: 100 INJECTION SUBCUTANEOUS at 04:21

## 2025-06-27 RX ADMIN — POTASSIUM CHLORIDE 40 MEQ: 1500 TABLET, EXTENDED RELEASE ORAL at 11:16

## 2025-06-27 RX ADMIN — SODIUM CHLORIDE, PRESERVATIVE FREE 10 ML: 5 INJECTION INTRAVENOUS at 08:59

## 2025-06-27 RX ADMIN — SERTRALINE 100 MG: 50 TABLET, FILM COATED ORAL at 08:58

## 2025-06-27 RX ADMIN — ENOXAPARIN SODIUM 40 MG: 100 INJECTION SUBCUTANEOUS at 08:58

## 2025-06-27 RX ADMIN — FUROSEMIDE 40 MG: 10 INJECTION, SOLUTION INTRAMUSCULAR; INTRAVENOUS at 00:07

## 2025-06-27 RX ADMIN — CLOPIDOGREL BISULFATE 300 MG: 75 TABLET, FILM COATED ORAL at 12:49

## 2025-06-27 RX ADMIN — TIOTROPIUM BROMIDE INHALATION SPRAY 3 MCG: 3.12 SPRAY, METERED RESPIRATORY (INHALATION) at 07:46

## 2025-06-27 RX ADMIN — FUROSEMIDE 80 MG: 40 TABLET ORAL at 08:58

## 2025-06-27 RX ADMIN — INSULIN LISPRO 2 UNITS: 100 INJECTION, SOLUTION INTRAVENOUS; SUBCUTANEOUS at 11:16

## 2025-06-27 RX ADMIN — ASPIRIN 81 MG: 81 TABLET, CHEWABLE ORAL at 08:58

## 2025-06-27 NOTE — PROGRESS NOTES
[facility] Medicine Progress Note  V 5.17      Date of Admission: 6/26/2025    Hospital Day: 2      Chief Admission Complaint:  left-sided weakness    Subjective:  Left leg weakness, numbness/tingling has resolved.  Left arm still n/t.      Presenting Admission History: \"60 y.o. female who presented to [facility] with left-sided numbness on the entire left side of her body that started yesterday around noon. She is in physical therapy for chronic back pain. States it does not seem like it has been getting any better so she went to come in for evaluation. She has been able to ambulate. Not experiencing any vision disturbances chest pain or shortness of breath at this time however, she did have a little bit of chest discomfort on the left side earlier today. She does have a mild headache but no blurry vision. She is not on any anticoagulation medications. She has no other complaints at this time. Review of systems otherwise negative.  PMHx significant for \"    Assessment/Plan:      Stroke-like symptoms.  CT head: no acute intracranial hemorrhage or mass effect.  CTA head/neck: no flow significant stenosis within head or neck.  MRI brain: no acute intracranial abnormality; no evidence of acute infarct.  Stroke team was not contacted by ED as pt was outside the window for therapeutic intervention.  Continue ASA x21 days then stop, continue plavix daily, statin.  PT/OT/SLP were consulted.  Neuro has been consulted, follow up in 3 months.  Most recent NIH = 1.  DVT prophylaxis: lovenox     Acute (resolved) on chronic HFpEF.  Likely due to hypertensive heart disease.  Echo 12/2023: EF 55-60%; poor study and cannot assess wall motion.  Repeat ordered 6/27.  Pt reports home weight to be 335-340lb.  Daily weights.  On admission 153.8kg, currently 152.4kg.  Strict I&O.  Received one dose of IV lasix.  Continue oral lasix, losartan.  BNP was 127 on arrival.  CHF orderset is in place    Hypokalemia.  Likely

## 2025-06-27 NOTE — ED NOTES
Marcelina Drummond is a 60 y.o. female admitted for  Principal Problem:    Acute left-sided weakness  Resolved Problems:    * No resolved hospital problems. *  .   Patient Home via EMS transportation with   Chief Complaint   Patient presents with    Tingling     Pt reports she was at physical therapy today for low back pain. Says she's had left side tingling since noon yesterday. States at PT she told the therapist and he checked her BP and it was high. Pt reporting she takes multiple medications including a BP medication. Says her therapist thought it was best to go to the ED for evaluation.     Hypertension    Nausea    Headache     Pt reports if she looks up it radiates pain down her neck into her spine and causes her to have a headache   .  Patient is alert and Person, Place, Time, and Situation  Patient's baseline mobility: Baseline Mobility: Cane   Code Status: Prior   Cardiac Rhythm:       Is patient on baseline Oxygen:  how many Liters:   Abnormal Assessment Findings:     Isolation: None      NIH Score:    C-SSRS: Risk of Suicide: No Risk  Bedside swallow:        Active LDA's:   Peripheral IV 06/26/25 Left Antecubital (Active)   Site Assessment Clean, dry & intact 06/26/25 1703   Line Status Blood return noted 06/26/25 1703   Phlebitis Assessment No symptoms 06/26/25 1703   Infiltration Assessment 0 06/26/25 1703     Patient admitted with a wang:  If the wang is chronic was it exchanged:  Reason for wang:   Patient admitted with Central Line:  . PICC line placement confirmed: YES OR NO:[id number]}   Reason for Central line:   Was central line Inserted from an outside facility:        Family/Caregiver Present no Any Concerns: no   Restraints no  Sitter no         Vitals: MEWS Score: 2    Vitals:    06/26/25 2059 06/26/25 2129 06/26/25 2228 06/26/25 2347   BP: (!) 148/92 (!) 158/68 (!) 149/75 (!) 162/63   Pulse:    79   Resp:       Temp:       TempSrc:       SpO2: 93% 93% 93% 92%   Weight:           Last documented

## 2025-06-27 NOTE — ED PROVIDER NOTES
EMERGENCY DEPARTMENT SUPERVISING PHYSICIAN NOTE    I have seen this patient & have reviewed history and findings with the PA, NP, or resident physician and provided direct supervision. I saw the patient and performed a substantive portion of the visit. I was present for key portions of any procedures performed. I've participated in medical management, monitoring, and treatment of this patient with the provider. I have reviewed currently available documentation, test results, and laboratory results in the interim. Care plan has been developed collaboratively. I take responsibility for the patient's management from when I was asked to get involved in this patient's care. Andrew and CINDY are the primary clinicians of record.    Brief HPI:  60F reports pins-and-needles like tingling or numbness to her left arm and left leg onset early yesterday afternoon  Adds that her face and legs seem minimally swollen compared to what is typical for her  Is prescribed furosemide 80 mg/day on outpatient basis, though for an unclear indication    Pertinent Exam Findings:  Awake, alert, chronically been acutely ill-appearing, not distressed  CTAB, RRR, 2+ radial pulses  Abdomen soft, NT, ND  Minimal lower extremity edema  No facial asymmetry, PERRLA, EOMI  Speech is clear and fluent  RUE - 5/5 strength, normal bulk and tone, no tremor  RLE - 5/5 strength, normal bulk and tone, no tremor  LUE - 5/5 strength, normal bulk and tone, no tremor  LLE - 5/5 strength, normal bulk and tone, no tremor  Sensation to light touch intact but slightly diminished to LUE and LLE compared to unaffected right side    CT HEAD WO CONTRAST   Final Result      No acute intracranial hemorrhage or mass effect.      Electronically signed by Javier Gamino DO      CTA HEAD NECK W CONTRAST   Final Result   1. No flow significant stenosis within the head or neck.   2. Groundglass opacities in the upper lobes, either reflecting edema or infection.      Electronically  Automated 20 0 - 30 mm/Hr   POCT Glucose   Result Value Ref Range    POC Glucose 175 (H) 70 - 99 mg/dl    Performed on ACCU-CHEK    EKG 12 Lead   Result Value Ref Range    Ventricular Rate 82 BPM    Atrial Rate 82 BPM    P-R Interval 188 ms    QRS Duration 80 ms    Q-T Interval 368 ms    QTc Calculation (Bazett) 429 ms    P Axis 13 degrees    R Axis 97 degrees    T Axis 18 degrees    Diagnosis       Normal sinus rhythmRightward axisSeptal infarct , age undeterminedAbnormal ECGWhen compared with ECG of 04-DEC-2023 10:07,Septal infarct is now Present     NIHSS Total Score: 0  Level of Consciousness: 0  What is Month/Age: 0  Open/Close Eyes/Hand: 0  Best Gaze: 0  Visual Fields: 0  Facial Palsy: 0  Motor Left Arm: 0  Motor Right Arm: 0  Motor Left Le  Motor Right Le  Limb Ataxia: 0  Sensory: 0  Best Language: 0  Dysarthria: 0  Extinction/Inattention: 0    Plan:  CT head negative for ICH  Not appropriate for thrombolysis given timing of symptom onset  CTA negative for LVO  Gave dose of aspirin    Pulmonary edema versus pneumonia incidentally noted on CT imaging  Looks a little bit hypervolemic clinically  Give additional furosemide now    Discussed exam findings, test results, potential etiologies of her presenting symptoms, and expected clinical course with her at length  Recommended that we proceed with hospitalization and inpatient management of her condition  She is in agreement with this plan  Consulted inpatient medicine staff to see her    Final Impression(s)  1. Left sided numbness    2. Paresthesias    3. Recurrent headache    4. Hypertension, unspecified type    5. Abnormal CT scan of lung         Demario Siddiqi MD  25 [phone]

## 2025-06-27 NOTE — PROGRESS NOTES
06/27/25 0337   NIV Type   $NIV $Daily Charge   NIV Started/Stopped On   Equipment Type v60   Mode Bilevel   Mask Type Full face mask   Mask Size Medium   Assessment   Respirations 17   Comfort Level Good   Using Accessory Muscles No   Mask Compliance Good   Skin Assessment Clean, dry, & intact   Skin Protection for O2 Device No  (pt declined to wear skin barrier)   Settings/Measurements   PIP Observed 26 cm H20   IPAP 25 cmH20   CPAP/EPAP 21 cmH2O   Vt (Measured) 739 mL   Rate Ordered 10   FiO2  21 %   Minute Volume (L/min) 10 Liters   Mask Leak (lpm) 1 lpm   Patient's Home Machine No   Alarm Settings   Alarms On Y   Low Pressure (cmH2O) 10 cmH2O   High Pressure (cmH2O) 35 cmH2O   Apnea (secs) 20 secs   RR Low (bpm) 6   RR High (bpm) 40 br/min

## 2025-06-27 NOTE — PROGRESS NOTES
4 Eyes Skin Assessment     The patient is being assess for  Admission    I agree that 2 RN's have performed a thorough Head to Toe Skin Assessment on the patient. ALL assessment sites listed below have been assessed.       Areas assessed by both nurses:   [x]   Head, Face, and Ears   [x]   Shoulders, Back, and Chest  [x]   Arms, Elbows, and Hands   [x]   Coccyx, Sacrum, and Ischum  [x]   Legs, Feet, and Heels        Does the Patient have Skin Breakdown?  No         Jesus Prevention initiated:  Yes   Wound Care Orders initiated:  No      [facility] nurse consulted for Pressure Injury (Stage 3,4, Unstageable, DTI, NWPT, and Complex wounds):  No      Nurse 1 eSignature: Electronically signed by Johanne Byrnes RN on 6/27/25 at 4:59 AM EDT    **SHARE this note so that the co-signing nurse is able to place an eSignature**    Nurse 2 eSignature: Electronically signed by Anurag Lowe RN on 6/27/25 at 5:40 AM EDT      Abdominal folds are moist, however no discoloration or excoriation.     Coccyx WNL.     Bilateral lower extremities darker red, firm, dry, dry flaky skin. (Resembling PVD)    3-4 Small healing scabs noted on abdomen.

## 2025-06-27 NOTE — PROGRESS NOTES
Patient is very pleasant and cooperative. Expresses that she has been attempting to get disability but concerned it may take longer than expected. She said that she lost her job in November to do her health, I believe and has not been able to find a job since.   She is concerned and said she has 2 months left on her lease in her apartment and is not able to pay for the next two months. She expressed she can and plans to move in with her sister and near her brother however they live 200 miles away.     She says in her current situation she lives on a first floor apartment and that her car sits nearly out the window in the disabled parking place.     She said that for food she never runs out but she does go to the food pantries therefore it is what food is available.     The patient seems to ambulate fairly decently with her cane expresses she can short term wise.     She has been approved for a rollator but is waiting on its arrival.

## 2025-06-27 NOTE — CARE COORDINATION
CTN contacted [facility] inpatient  on C5 and l/m on [phone] regarding patient eligibility for RPM.     Alvina Rogers RN

## 2025-06-27 NOTE — PLAN OF CARE
Problem: Chronic Conditions and Co-morbidities  Goal: Patient's chronic conditions and co-morbidity symptoms are monitored and maintained or improved  Outcome: Progressing     Problem: Discharge Planning  Goal: Discharge to home or other facility with appropriate resources  Outcome: Progressing     Problem: Pain  Goal: Verbalizes/displays adequate comfort level or baseline comfort level  Outcome: Progressing  Flowsheets (Taken 6/27/2025 8513)  Verbalizes/displays adequate comfort level or baseline comfort level:   Encourage patient to monitor pain and request assistance   Assess pain using appropriate pain scale   Administer analgesics based on type and severity of pain and evaluate response   Implement non-pharmacological measures as appropriate and evaluate response   Notify Licensed Independent Practitioner if interventions unsuccessful or patient reports new pain   Consider cultural and social influences on pain and pain management     Problem: Safety - Adult  Goal: Free from fall injury  Outcome: Progressing     Problem: ABCDS Injury Assessment  Goal: Absence of physical injury  Outcome: Progressing     Problem: Neurosensory - Adult  Goal: Achieves stable or improved neurological status  Outcome: Progressing  Goal: Achieves maximal functionality and self care  Outcome: Progressing

## 2025-06-27 NOTE — PROGRESS NOTES
Per serve sent to Beronica Deluca NP:     Remy Loco,     This patient uses a bipap at home with her settings as  25/21. She does not have someone at home to bring hers into her and she is worried about going to bed and sleeping without one. She is currently sitting on the couch waiting on someone to bring her a bipap so she can get in bed to rest. I spoke to Dr. Rueda when she was in the room and told her and she said she would put in orders. She put in orders for her to use her home bipap. This does not seem likely for her stay that she will have hers from home.     Please advise.     Thank you,   Johanne

## 2025-06-27 NOTE — H&P
[facility] Medicine History & Physical    V 5.1    Date of Admission: 6/26/2025    Date of Service:  Pt seen/examined on 6/26/2025     [x]Admitted to Inpatient with expected LOS greater than two midnights due to medical therapy.  []Placed in Observation status.    Chief Admission Complaint: Left-sided numbness    Presenting Admission History:      60 y.o. female who presented to [facility] with left-sided numbness on the entire left side of her body that started yesterday around noon. She is in physical therapy for chronic back pain. States it does not seem like it has been getting any better so she went to come in for evaluation. She has been able to ambulate. Not experiencing any vision disturbances chest pain or shortness of breath at this time however, she did have a little bit of chest discomfort on the left side earlier today. She does have a mild headache but no blurry vision. She is not on any anticoagulation medications. She has no other complaints at this time. Review of systems otherwise negative.  ED course reviewed and discussed with ED physician and patient.  See chart documentation, test results, notes for details.    Assessment/Plan:      Left sided weakness   Acute CVA/TIA  HTN   HLD  DMT2 on po medication   Morbid obesity BMI 54  MEHUL on BIPAP qHS  Asthma without exacerbation   Chronic BLE edema    Patient is clinically stable.  She reports symptomatic improvement.  She is distress.  Exam is without acute abnormalities.  She is mentating normally.  No tremor present.  Blood pressure is elevated, allow for permissive hypertensive now.  No medications with patient and RN and pharmacist.  MRI brain ordered.  Neurology consult ordered.  Aspirin statin PT OT ST labs and daily monitoring ordered.  Continue current treatment, supportive care and monitoring in hospital.  Patient in agreement with plan.  Discussed with RN.        Discussed management and the need for Hospitalization of

## 2025-06-27 NOTE — PROGRESS NOTES
Cardiac event monitor applied with demonstration and return demonstration by patient.patient educated on how to care for the device and handle it at home and a copy of the instructions with supplies and a phone handed to the patient  wheeled off the unit  mel a stable condition

## 2025-06-27 NOTE — CARE COORDINATION
Case Management Assessment  Initial Evaluation    Date/Time of Evaluation: 6/27/2025 11:57 AM  Assessment Completed by: Berkley Elaine RN    If patient is discharged prior to next notation, then this note serves as note for discharge by case management.    Patient Name: Marcelina Drummond                   YOB: 1965  Diagnosis: Paresthesias [R20.2]  Acute left-sided weakness [R53.1]  Abnormal CT scan of lung [R91.8]  Left sided numbness [R20.0]  Recurrent headache [R51.9]  Hypertension, unspecified type [I10]  Congestive heart failure, unspecified HF chronicity, unspecified heart failure type (HCC) [I50.9]                   Date / Time: 6/26/2025  4:46 PM    Patient Admission Status: Inpatient   Readmission Risk (Low < 19, Mod (19-27), High > 27): Readmission Risk Score: 9.8    Current PCP: Alexander Dumont MD  PCP verified by CM? Yes (Dr. Padilla)    Chart Reviewed: Yes      History Provided by: Patient, Medical Record  Patient Orientation: Alert and Oriented    Patient Cognition: Alert    Hospitalization in the last 30 days (Readmission):  No    If yes, Readmission Assessment in CM Navigator will be completed.    Advance Directives:      Code Status: Full Code   Patient's Primary Decision Maker is: Legal Next of Kin      Discharge Planning:    Patient lives with: Alone Type of Home: Apartment  Primary Care Giver: Self  Patient Support Systems include: Family Members   Current Financial resources: None  Current community resources: None  Current services prior to admission: Home Bipap            Current DME:              Type of Home Care services:  None    ADLS  Prior functional level: Independent in ADLs/IADLs  Current functional level: Independent in ADLs/IADLs    PT AM-PAC: 22 /24  OT AM-PAC: 24 /24    Family can provide assistance at DC: No  Would you like Case Management to discuss the discharge plan with any other family members/significant others, and if so, who? No  Plans to Return to Present

## 2025-06-27 NOTE — CARE COORDINATION
CASE MANAGEMENT DISCHARGE SUMMARY      Discharge to: Home w/ OP Therapy    Precertification completed: N/A  Hospital Exemption Notification (HENS) completed: N/A    IMM given: (date) N/A    New Durable Medical Equipment ordered/agency: N/A    Transportation:    Family/car: Personal    Confirmed discharge plan with:     Patient: yes     Family:  yes        RN, name: Giancarlo    Note: Discharging nurse to complete CIPRIANO, reconcile AVS, and place final copy with patient's discharge packet. RN to ensure that written prescriptions for  Level II medications are sent with patient to the facility as per protocol.      Berkley Elaine RN

## 2025-06-27 NOTE — PROGRESS NOTES
Occupational Therapy  Facility/Department: [facility] C5 - MED SURG/ORTHO  Occupational Therapy Initial Assessment and Treatment Note 1x    Name: Marcelina Drummond  : 1965  MRN: [id number]  Date of Service: 2025    Discharge Recommendations:  Home with assist PRN      Patient Diagnosis(es): The primary encounter diagnosis was Left sided numbness. Diagnoses of Paresthesias, Recurrent headache, Hypertension, unspecified type, Abnormal CT scan of lung, and Congestive heart failure, unspecified HF chronicity, unspecified heart failure type (HCC) were also pertinent to this visit.  Past Medical History:  has a past medical history of Anxiety, Asthma, Class 3 obesity (HCC), CPAP rhinitis, Diabetes mellitus (HCC), Hyperlipidemia, Hypertension, MEHUL on CPAP, and Swelling of extremity.  Past Surgical History:  has a past surgical history that includes ovarian cyst removal; Ovary removal; Cholecystectomy; and Carpal tunnel release.     Assessment  Assessment: Pt seen for OT eval and tx after admission for LUE/LLE weakness and tingling.  Pt lives alone in first floor apartment and is independent at baseline with BADLs.  She reports difficulty with IADLs d/t hx of back pain. During OT, pt completed self-care tasks (LB dressing, grooming and toileting) at Ind level. She is Mod I for transfers to toilet and couch with cane Pt reports that she may move in with sister soon.  Pt has shower chair at home.  Further OT is not needed d/t pt functioning at baseline.  Recommend home w/A prn at d/c.  Decision Making: Medium Complexity  REQUIRES OT FOLLOW-UP: No  Activity Tolerance  Activity Tolerance: Patient Tolerated treatment well     Restrictions  Restrictions/Precautions  Restrictions/Precautions: General Precautions  Activity Level: Up as Tolerated    Subjective  General  Chart Reviewed: Yes  Patient assessed for rehabilitation services?: Yes  Referring Practitioner: MADELINE Rueda  Diagnosis: acute L sided weakness  Subjective  Subjective:
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
## 2025-06-27 NOTE — PROGRESS NOTES
Physical Therapy  Facility/Department: [facility] C5 - MED SURG/ORTHO  Physical Therapy Initial Assessment/Treatment/Discharge    Name: Marcelina Drummond  : 1965  MRN: [id number]  Date of Service: 2025    Discharge Recommendations:  Home with assist PRN, Outpatient PT   PT Equipment Recommendations  Equipment Needed: No  Other: pt has appropriate DME at this time.      Patient Diagnosis(es): The primary encounter diagnosis was Left sided numbness. Diagnoses of Paresthesias, Recurrent headache, Hypertension, unspecified type, Abnormal CT scan of lung, and Congestive heart failure, unspecified HF chronicity, unspecified heart failure type (HCC) were also pertinent to this visit.  Past Medical History:  has a past medical history of Anxiety, Asthma, Class 3 obesity (HCC), CPAP rhinitis, Diabetes mellitus (HCC), Hyperlipidemia, Hypertension, MEHUL on CPAP, Painful diabetic neuropathy (HCC), and Swelling of extremity.  Past Surgical History:  has a past surgical history that includes ovarian cyst removal; Ovary removal; Cholecystectomy; and Carpal tunnel release.    Assessment  Body Structures, Functions, Activity Limitations Requiring Skilled Therapeutic Intervention: Decreased functional mobility ;Decreased strength;Decreased endurance;Decreased balance;Decreased coordination;Decreased ROM  Assessment: Pt is a 60 y.o female admitted to [facility] for acute left-sided weakness. PTA patient reports being IND w/ mobility and ambulation w/ SPC. Pt reports living alone in an apartment w/ 5 RUDDY but is moving in a month to be w/ sister who has a whole flight of stairs. Pt currently demos bed mobility Mod IND, functional transfers Mod IND w/ SPC, stair navigation w/ SPC and LHR req SBA, and ambulation w/ SPV and SPC. Pt ambualtes 20ft in room w/ widened gait and increased med-lat sway but no overt LOB and c/o dizziness upon standing w/ 1 LOB that was self-corrected. Pt navigated stairs w/ SBA and no LOB. Pt reports being near her  store for groceries and also has them delivered. Does  laundry and cleaning with difficulty.)  Prior Level of Assist for Ambulation: Independent household ambulator, with or without device, Independent community ambulator, with or without device (cane)  Prior Level of Assist for Transfers: Independent  Active : Yes  Occupation: Unemployed  Leisure & Hobbies: Streaming news and TV shows  Additional Comments: Pt reports moving 7/26 to move in w/ sister who lives out of town  Vision/Hearing  Vision  Vision: Impaired  Vision Exceptions: Wears glasses at all times  Hearing  Hearing: Exceptions to WFL  Hearing Exceptions: Hard of hearing/hearing concerns (L > R but WFL)    Cognition   Orientation  Overall Orientation Status: Within Normal Limits  Orientation Level: Oriented X4  Cognition  Overall Cognitive Status: WNL    Objective  Temp: 97.5 °F (36.4 °C)  Pulse: 85  Heart Rate Source: Monitor  Respirations: 20  SpO2: 93 %  O2 Device: None (Room air)  BP: (!) 158/81  MAP (Calculated): 107  BP Location: Right upper arm  BP Method: Automatic  Patient Position: Up in chair     Observation/Palpation  Posture: Fair  Observation: Sits w/ a light L lateral lean  Gross Assessment  AROM: Generally decreased, functional  Strength: Generally decreased, functional  Coordination: Generally decreased, functional  Sensation: Impaired (BLE neuropathy, more on medial foot)                   Bed Mobility Training  Bed Mobility Training: Yes  Overall Level of Assistance: Modified independent  Interventions: Verbal cues;Safety awareness training  Supine to Sit: Modified independent  Sit to Supine: Modified independent  Scooting: Supervision;Modified independent  Balance  Sitting: Intact  Standing: Intact (with cane)  Transfer Training  Transfer Training: Yes  Sit to Stand: Modified independent  Stand to Sit: Modified independent  Stand Pivot Transfers: Modified independent (cane)  Toilet Transfer: Modified independent  Gait  Gait

## 2025-06-27 NOTE — TELEPHONE ENCOUNTER
Monitor placed by hospital RN  Monitor company [employer]  Length of monitor 30 days  Monitor ordered by Dr Wolfgang Carnes  Phone ID [id number]  First Patch ID [id number]  Activation successful prior to pt leaving office? Placed by hospital RN

## 2025-06-27 NOTE — PROGRESS NOTES
Patients prescription picked the ezetimibe medication wasn't dispensed pharmacist explained to the patient via phone on why and how to get it  happy with the explanation  '

## 2025-06-28 NOTE — DISCHARGE SUMMARY
Hospital Medicine Discharge Summary    Patient: Marcelina Drummond   : 1965     Hospital:  [facility]  Admit Date: 2025   Discharge Date: 2025   Disposition:  Home   Code status:  Full  Condition at Discharge: Stable  Primary Care Provider: Alexander Dumont MD    Admitting Provider: Viridiana Rueda MD  Discharge Provider: Alicia Simons, APRN - CNP     Discharge Diagnoses:      Active Hospital Problems    Diagnosis     Acute left-sided weakness [R53.1]      Presenting Admission History:  \"60 y.o. female who presented to [facility] with left-sided numbness on the entire left side of her body that started yesterday around noon. She is in physical therapy for chronic back pain. States it does not seem like it has been getting any better so she went to come in for evaluation. She has been able to ambulate. Not experiencing any vision disturbances chest pain or shortness of breath at this time however, she did have a little bit of chest discomfort on the left side earlier today. She does have a mild headache but no blurry vision. She is not on any anticoagulation medications. She has no other complaints at this time. Review of systems otherwise negative.  PMHx significant for \"     Assessment/Plan:       Stroke-like symptoms.  CT head: no acute intracranial hemorrhage or mass effect.  CTA head/neck: no flow significant stenosis within head or neck.  MRI brain: no acute intracranial abnormality; no evidence of acute infarct.  Stroke team was not contacted by ED as pt was outside the window for therapeutic intervention.  Continue ASA x21 days then stop, continue plavix daily, statin.  PT/OT/SLP were consulted.  Neuro has been consulted, follow up in 3 months.  Most recent NIH = 1.  DVT prophylaxis: lovenox      Acute (resolved) on chronic HFpEF.  Likely due to hypertensive heart disease.  Echo 2023: EF 55-60%; poor study and cannot assess wall motion.  Repeat ordered .   right common carotid artery is normal. The cervical course of the right common carotid artery is unremarkable.  The left carotid bulb is normal. The origin of the right internal carotid artery is normal. The right carotid terminus is unremarkable. The origin of the left vertebral artery is normal. The cervical course of the left vertebral artery is unremarkable. The intradural segment of the left vertebral artery is patent. The origin of the right vertebral artery is normal. The cervical course of the right vertebral artery is unremarkable. The intradural segment of the right vertebral artery is patent. The basilar artery is patent. The posterior cerebral arteries are patent. The venous system of the brain is patent. No areas of abnormal enhancement are identified within the brain parenchyma. The thyroid, submandibular, and parotid glands are normal. The structures of the aerodigestive tract are intact. No lymphadenopathy is visualized. There are groundglass opacities in the bilateral upper lobes. The  spaces are normal. The globes and orbits are intact. The paranasal sinuses and mastoid air cells are clear. No destructive osseous lesion is identified.     1. No flow significant stenosis within the head or neck. 2. Groundglass opacities in the upper lobes, either reflecting edema or infection. Electronically signed by Javier Gamino DO    CT HEAD WO CONTRAST  Result Date: 6/26/2025  CT HEAD WITHOUT CONTRAST HISTORY: Left-sided deficits. PROCEDURE: Noncontrast CT the brain performed with 5 mm contiguous sections. Individualized dose optimization technique was used in order to meet ALARA standards for radiation dose reduction.  In addition to vendor specific dose reduction algorithms, the  dose reduction techniques vary based on the specific scanner utilized but frequently include automated exposure control, adjustment of the mA and/or kV according to patient size, and use of iterative reconstruction

## 2025-07-01 ENCOUNTER — CARE COORDINATION (OUTPATIENT)
Dept: CASE MANAGEMENT | Age: 60
End: 2025-07-01

## 2025-07-01 NOTE — CARE COORDINATION
Care Transitions Note    Initial Call - Call within 2 business days of discharge: Yes    Attempted to reach patient for transitions of care follow up. Unable to reach patient.    Outreach Attempts:   HIPAA compliant voicemail left for patient.     [employer] message sent to patient.    Patient: Marcelina Drummond    Patient : 1965   MRN: [id number]    Reason for Admission: Acute Left sided weakness  Discharge Date: 25  RURS: Readmission Risk Score: 9    Last Discharge Facility       Date Complaint Diagnosis Description Type Department Provider    25 Tingling; Hypertension; Nausea; Headache Left sided numbness ... ED to Hosp-Admission (Discharged) (ADMITTED) Miguel Singh MD; Demario Siddiqi...            Was this an external facility discharge? No    Follow Up Appointment:   Patient does not have a follow up appointment scheduled at time of call. Currently there is no HFU appt scheduled with the PCP that writer is aware of - PCP is [facility].  Future Appointments         Provider Specialty Dept Phone    2025 2:20 PM Orlando Cervantes MD Pulmonology [phone]            Plan for follow-up on next business day.      Ana Paula Mota RN BSN  Care Transition Nurse  [phone]

## 2025-07-02 ENCOUNTER — CARE COORDINATION (OUTPATIENT)
Dept: CASE MANAGEMENT | Age: 60
End: 2025-07-02

## 2025-07-02 NOTE — CARE COORDINATION
Care Transitions Note    Initial Call - Call within 2 business days of discharge: Yes    Patient Current Location:  Home: [address] [place] [address] [address]  [facility] [address]    Care Transition Nurse contacted the patient by telephone to perform post hospital discharge assessment, verified name and  as identifiers.  Provided introduction to self, and explanation of the Care Transition Nurse role.    Patient: Marcelina Drummond    Patient : 1965   MRN: [id number]    Reason for Admission: Acute left sided weakness  Discharge Date: 25  RURS: Readmission Risk Score: 9      Last Discharge Facility       Date Complaint Diagnosis Description Type Department Provider    25 Tingling; Hypertension; Nausea; Headache Left sided numbness ... ED to Hosp-Admission (Discharged) (ADMITTED) Miguel Singh MD; Demario Siddiqi...            Was this an external facility discharge? No    Additional needs identified to be addressed with provider   No needs identified             Method of communication with provider: none.    Patients top risk factors for readmission: medical condition-acute left sided weakness    Interventions to address risk factors:   Review of patient management of conditions/medications: acute left sided weakness    Care Summary Note: Marcelina states she is doing \"ok.\"  Marcelina states she is \"sore\" \"I did PT earlier and I am sore.\"  She states she usually monitors her B/P daily but has not done so yet today.  Marcelina states she is participating in outpatient PT.  She denies any tingling sensation on her left side at this time. She states she is still weak, but states she is stronger than she was last week.  Marcelina denies any chest pain or SOB at this time.  She states she is experiencing a H/A. She states it is related to activities she participated in at PT, and she states the providers are aware.  Marcelina states she has edema in her feet & legs - she states this is chronic & stable.  She states she has a CGM

## 2025-07-10 ENCOUNTER — CARE COORDINATION (OUTPATIENT)
Dept: CASE MANAGEMENT | Age: 60
End: 2025-07-10

## 2025-07-10 NOTE — CARE COORDINATION
Care Transitions Note    Follow Up Call     Patient Current Location:  Home: [address] [place] [address] [address] [place] [address]    LPN Care Coordinator contacted the patient by telephone. Verified name and  as identifiers.    Additional needs identified to be addressed with provider   No needs identified                 Method of communication with provider: none.    Care Summary Note: LPN CC spoke with patient. States she's doing ok. Feeling fatigued and somewhat weak. Still having some weakness to left side, though it is improving. A lot of tingling to LUE from shoulder down to fingertips. BP is \"normal.\" States she's having a hard time following a low carb diet d/t getting whatever she can from the food pantry. States she will be moving  with her sister. This should improve her situation a lot. Denies issues with urinating. She is having some diarrhea d/t recent atbx for chronic GI issues. Working on getting a walker. She does not wish to rent one d/t leaving at the end of the month to stay with her sister. LPN CC suggested reaching out to [facility]. Denies needs.     Plan of care updates since last contact:  Review of patient management of conditions/medications:         Advance Care Planning:   Does patient have an Advance Directive: deferred at this time, will discuss on future follow up. .    Medication Review:  No changes since last call.     Remote Patient Monitoring:  Offered patient enrollment in the Remote Patient Monitoring (RPM) program for in-home monitoring: Deferred at this time because patient planning on moving soon; will discuss at next outreach.    Assessments:  Care Transitions Subsequent and Final Call    Subsequent and Final Calls  Care Transitions Interventions  Other Interventions:              Follow Up Appointment:   Reviewed upcoming appointment(s).  Future Appointments         Provider Specialty Dept Phone    2025 2:20 PM Orlando Cervantes MD Pulmonology [phone]

## 2025-07-17 ENCOUNTER — CARE COORDINATION (OUTPATIENT)
Dept: CASE MANAGEMENT | Age: 60
End: 2025-07-17

## 2025-07-24 ENCOUNTER — CARE COORDINATION (OUTPATIENT)
Dept: CASE MANAGEMENT | Age: 60
End: 2025-07-24

## 2025-07-24 NOTE — CARE COORDINATION
Care Transitions Note    Follow Up Call     Attempted to reach patient for transitions of care follow up.  Unable to reach patient.      Outreach Attempts:   HIPAA compliant voicemail left for patient.     Care Summary Note: N/A    Follow Up Appointment:   Future Appointments         Provider Specialty Dept Phone    9/23/2025 2:20 PM Orlando Cervantes MD Pulmonology [phone]            Plan for follow-up call in 2-5 days based on inability to reach patient today.    Ana Paula Mota RN BSN  Care Transition Nurse  [phone]

## 2025-07-29 ENCOUNTER — CARE COORDINATION (OUTPATIENT)
Dept: CASE MANAGEMENT | Age: 60
End: 2025-07-29

## 2025-07-29 NOTE — CARE COORDINATION
Care Transitions Note    Final Call   Patient: Marcelina Drummond                                    Patient : 1965   MRN: [id number]                             Reason for Admission: Acute Left sided weakness  Discharge Date: 25       RURS: Readmission Risk Score: 9     Attempted to reach patient for transitions of care follow up.  Unable to reach patient.      Outreach Attempts:   HIPAA compliant voicemail left for patient.     Patient closed (unable to reach for final call) from the Care Transitions program on 2025.  Patient/family utr.      Handoff:   Patient was not referred to the ACM team due to unable to contact patient.      Care Summary Note:       Assessments:  Care Transitions Subsequent and Final Call    Subsequent and Final Calls  Care Transitions Interventions  Other Interventions:              Upcoming Appointments:    Future Appointments         Provider Specialty Dept Phone    2025 2:20 PM Orlando Cervantes MD Pulmonology [phone]            Elza Smith LPN

## 2025-07-31 LAB — ECHO BSA: 2.66 M2
